# Patient Record
Sex: FEMALE | Race: WHITE | Employment: OTHER | URBAN - METROPOLITAN AREA
[De-identification: names, ages, dates, MRNs, and addresses within clinical notes are randomized per-mention and may not be internally consistent; named-entity substitution may affect disease eponyms.]

---

## 2021-11-24 DIAGNOSIS — M25.561 RIGHT KNEE PAIN, UNSPECIFIED CHRONICITY: Primary | ICD-10-CM

## 2021-11-30 ENCOUNTER — OFFICE VISIT (OUTPATIENT)
Dept: ORTHOPEDIC SURGERY | Age: 76
End: 2021-11-30
Payer: MEDICARE

## 2021-11-30 VITALS — TEMPERATURE: 98 F | WEIGHT: 160 LBS | HEIGHT: 65 IN | BODY MASS INDEX: 26.66 KG/M2

## 2021-11-30 DIAGNOSIS — M17.11 PRIMARY OSTEOARTHRITIS OF RIGHT KNEE: Primary | ICD-10-CM

## 2021-11-30 PROCEDURE — G8400 PT W/DXA NO RESULTS DOC: HCPCS | Performed by: ORTHOPAEDIC SURGERY

## 2021-11-30 PROCEDURE — G8417 CALC BMI ABV UP PARAM F/U: HCPCS | Performed by: ORTHOPAEDIC SURGERY

## 2021-11-30 PROCEDURE — 3017F COLORECTAL CA SCREEN DOC REV: CPT | Performed by: ORTHOPAEDIC SURGERY

## 2021-11-30 PROCEDURE — 4004F PT TOBACCO SCREEN RCVD TLK: CPT | Performed by: ORTHOPAEDIC SURGERY

## 2021-11-30 PROCEDURE — G8484 FLU IMMUNIZE NO ADMIN: HCPCS | Performed by: ORTHOPAEDIC SURGERY

## 2021-11-30 PROCEDURE — 1123F ACP DISCUSS/DSCN MKR DOCD: CPT | Performed by: ORTHOPAEDIC SURGERY

## 2021-11-30 PROCEDURE — 1090F PRES/ABSN URINE INCON ASSESS: CPT | Performed by: ORTHOPAEDIC SURGERY

## 2021-11-30 PROCEDURE — 99203 OFFICE O/P NEW LOW 30 MIN: CPT | Performed by: ORTHOPAEDIC SURGERY

## 2021-11-30 PROCEDURE — G8427 DOCREV CUR MEDS BY ELIG CLIN: HCPCS | Performed by: ORTHOPAEDIC SURGERY

## 2021-11-30 PROCEDURE — 4040F PNEUMOC VAC/ADMIN/RCVD: CPT | Performed by: ORTHOPAEDIC SURGERY

## 2021-11-30 RX ORDER — MIRTAZAPINE 15 MG/1
TABLET, FILM COATED ORAL
COMMUNITY
Start: 2021-10-18

## 2021-11-30 RX ORDER — VALSARTAN AND HYDROCHLOROTHIAZIDE 80; 12.5 MG/1; MG/1
TABLET, FILM COATED ORAL
COMMUNITY
Start: 2021-10-18

## 2021-11-30 RX ORDER — METFORMIN HYDROCHLORIDE 500 MG/1
TABLET, EXTENDED RELEASE ORAL
COMMUNITY
Start: 2021-10-18

## 2021-11-30 RX ORDER — ALPRAZOLAM 0.5 MG/1
TABLET ORAL
COMMUNITY
Start: 2021-09-22

## 2021-11-30 NOTE — PROGRESS NOTES
Chief Complaint   Patient presents with    Knee Pain     right knee pain started a while ago has been walking but seems to be getting better. But it locked and couldnt bend it and had swelling at the time she called an made an appt       Subjective:     Patient ID: Sabrina Benavides is a 77 y.o..  female    Knee Pain  Patient complains of right knee pain. This is evaluated as a personal injury. There was not a history of injury. The pain began several years ago. The pain is located medial, anterior. She describes  Her symptoms as aching. She has experienced popping, clicking, locking, and giving way in the affected knee. The patient has had pain with kneeling, squating, and climbing stairs. Symptoms improve with rest, heat, ice. The symptoms are worse with activity. The knee has given out or felt unstable. The patient cannot bend and straighten the knee fully. The patient is active in none. Treatment to date has been ice, heat, Tylenol, without significant relief. The patient is not working. The patients occupation is retired. Past Medical History:   Diagnosis Date    Anxiety     Diabetes ([facility] [address]     HTN (hypertension)      Past Surgical History:   Procedure Laterality Date    APPENDECTOMY      CHOLECYSTECTOMY      ELBOW SURGERY      HYSTERECTOMY      SHOULDER SURGERY         Current Outpatient Medications:     ALPRAZolam (XANAX) 0.5 MG tablet, take 1 tablet by mouth three times a day if needed, Disp: , Rfl:     metFORMIN (GLUCOPHAGE-XR) 500 MG extended release tablet, , Disp: , Rfl:     valsartan-hydroCHLOROthiazide (DIOVAN-HCT) 80-12.5 MG per tablet, , Disp: , Rfl:     mirtazapine (REMERON) 15 MG tablet, , Disp: , Rfl:   Allergies   Allergen Reactions    Sulfa Antibiotics Anaphylaxis     Swells     Tramadol Other (See Comments)     Makes her mind Crazy      Social History     Socioeconomic History    Marital status:       Spouse name: Not on file    Number of children: Not on file  Years of education: Not on file    Highest education level: Not on file   Occupational History    Not on file   Tobacco Use    Smoking status: Not on file    Smokeless tobacco: Not on file   Substance and Sexual Activity    Alcohol use: Not on file    Drug use: Not on file    Sexual activity: Not on file   Other Topics Concern    Not on file   Social History Narrative    Not on file     Social Determinants of Health     Financial Resource Strain:     Difficulty of Paying Living Expenses: Not on file   Food Insecurity:     Worried About Running Out of Food in the Last Year: Not on file    Mei of Food in the Last Year: Not on file   Transportation Needs:     Lack of Transportation (Medical): Not on file    Lack of Transportation (Non-Medical): Not on file   Physical Activity:     Days of Exercise per Week: Not on file    Minutes of Exercise per Session: Not on file   Stress:     Feeling of Stress : Not on file   Social Connections:     Frequency of Communication with Friends and Family: Not on file    Frequency of Social Gatherings with Friends and Family: Not on file    Attends Hindu Services: Not on file    Active Member of [address] [place] [address] or Organizations: Not on file    Attends Club or Organization Meetings: Not on file    Marital Status: Not on file   Intimate Partner Violence:     Fear of Current or Ex-Partner: Not on file    Emotionally Abused: Not on file    Physically Abused: Not on file    Sexually Abused: Not on file   Housing Stability:     Unable to Pay for Housing in the Last Year: Not on file    Number of [place] in the Last Year: Not on file    Unstable Housing in the Last Year: Not on file     History reviewed. No pertinent family history. REVIEW OF SYSTEMS:     General/Constitution:  (-)weight loss, (-)fever, (-)chills, (-)weakness. Skin: (-) rash,(-) psoriasis,(-) eczema, (-)skin cancer.    Musculoskeletal: (-) fractures,  (-) dislocations,(-) collagen vascular disease, (-) fibromyalgia, (-) multiple sclerosis, (-) muscular dystrophy, (-) RSD,(-) joint pain (-)swelling, (-) joint pain,swelling. Neurologic: (-) epilepsy, (-)seizures,(-) brain tumor,(-) TIA, (-)stroke, (-)headaches, (-)Parkinson disease,(-) memory loss, (-) LOC. Cardiovascular: (-) Chest pain, (-) swelling in legs/feet, (-) SOB, (-) cramping in legs/feet with walking. Respiratory: (-) SOB, (-) Coughing, (-) night sweats. GI: (-) nausea, (-) vomiting, (-) diarrhea, (-) blood in stool, (-) gastric ulcer. Psychiatric: (-) Depression, (-) Anxiety, (-) bipolar disease, (-) Alzheimer's Disease  Allergic/Immunologic: (-) allergies latex, (-) allergies metal, (-) skin sensitivity. Hematlogic: (-) anemia, (-) blood transfusion, (-) DVT/PE, (-) Clotting disorders    Subjective:    Constitution:  Temp 98 °F (36.7 °C)   Ht 5' 5\" (1.651 m)   Wt 160 lb (72.6 kg)   BMI 26.63 kg/m²     Psycihatric:  The patient is alert and oriented x 3, appears to be stated age and in no distress. Respiratory:  Respiratory effort is not labored. Patient is not gasping. Palpation of the chest reveals no tactile fremitus. Skin:  Upon inspection: the skin appears warm, dry and intact. There is  a previous scar over the affected area. There is any cellulitis, lymphedema or cutaneous lesions noted in the lower extremities. Upon palpation there is no induration noted. Neurologic:  Gait: abnormal;  Motor exam of the lower extremities show ; quadriceps, hamstrings, foot dorsi and plantar flexors intact R.  5/5 and L. 5/5. Deep tendon reflexes are 2/4 at the knees and 2/4 at the ankles with strong extensor hallicus longus motor strength bilaterally. Sensory to both feet is intact to all sensory roots. Cardiovascular: The vascular exam is normal and is well perfused to distal extremities. Distal pulses DP/PT: R. 2+; L. 2+. There is cap refill noted less than two seconds in all digits.  There is not edema of the bilateral lower extremities. There is not varicosities noted in the distal extremities. Lymph:  Upon palpation,  there is no lymphadenopathy noted in bilateral lower extremities. Musculoskeletal:  Gait: abnormal; examination of the nails and digits reveal no cyanosis or clubbing. Lumbar exam:  On visual inspection, there is not deformity of the spine. full range of motion, no tenderness, palpable spasm or pain on motion. Special tests: Straight Leg Raise negative, Landen test negative. Hip exam:   Upon inspection, there is not deformity noted. Upon palpation there is not tenderness. ROM: is  full and symmetrical.   Strength: Hip Flexors 5/5; Hip Abductors 5/5; Hip Adduction 5/5. Knee exam:  Right knee exam shows;  range of motion of R. Knee is 0 to 115, and L. Knee is 0 to 120. The patient does have  pain on motion, effusion is mild, there is not tenderness over the  medial, lateral, anterior region, there are not any masses, there is not ligamentous instability, there is not  deformity noted. Knee exam: right positive for moderate crepitations, some mild tenderness laxity is not noted with  stress. There is not a popliteal cyst.    R. Knee:  Lachman's negative, Anterior Drawer negative, Posterior Drawer negative  Micha's negative, Thallasy  negative,   PF grind test negative, Apprehension test negative, Patellar J sign  negative  L. Knee:  Lachman's negative, Anterior Drawer negative, Posterior Drawer negative  Micha's negative, Thallasy  negative,   PF grind test negative, Apprehension test negative,  Patellar J sign  negative    Xray Exam:  1. There is no fracture dislocation right knee   2. Mild degenerative changes of the right knee more prominent within the   medial compartment.           Radiographic findings reviewed with patient    Assessment:  Encounter Diagnoses   Name Primary?     Primary osteoarthritis of right knee Yes       Plan:  Natural history and expected course discussed. Questions answered. Educational materials distributed. Rest, ice, compression, and elevation (RICE) therapy. Reduction in offending activity. Patellar compression sleeve. I had a lengthy discussion with the patient regarding their diagnosis. I explained treatment options including surgical vs non surgical treatment. I reviewed in detail the risks and benefits and outlined the procedure in detail with expected outcomes and possible complications. I also discussed non surgical treatment such as injections (CSI and visco supplementation), physical therapy, topical creams and NSAID's. They have elected for conservative management at this time. She will call in the future if she is interested in knee injection.

## 2023-02-18 PROBLEM — R55 SYNCOPE AND COLLAPSE: Status: ACTIVE | Noted: 2023-02-18

## 2023-02-18 PROBLEM — K57.32 DIVERTICULITIS, COLON: Status: ACTIVE | Noted: 2023-02-18

## 2023-02-18 PROBLEM — U07.1 COVID-19 VIRUS INFECTION: Status: ACTIVE | Noted: 2023-02-18

## 2023-02-18 PROBLEM — R11.0 MODERATE NAUSEA: Status: ACTIVE | Noted: 2023-02-18

## 2023-02-18 PROBLEM — B37.31 VULVOVAGINITIS CANDIDA ALBICANS: Status: ACTIVE | Noted: 2023-02-18

## 2023-02-18 PROBLEM — M79.10 MYALGIA: Status: ACTIVE | Noted: 2023-02-18

## 2023-02-18 PROBLEM — J45.990 ASTHMA, EXERCISE INDUCED (HHS-HCC): Status: ACTIVE | Noted: 2023-02-18

## 2023-02-18 PROBLEM — E55.9 VITAMIN D DEFICIENCY: Status: ACTIVE | Noted: 2023-02-18

## 2023-02-18 PROBLEM — K43.2 HERNIA, INCISIONAL: Status: ACTIVE | Noted: 2023-02-18

## 2023-02-18 PROBLEM — E78.01 FAMILIAL HYPERCHOLESTEREMIA: Status: ACTIVE | Noted: 2023-02-18

## 2023-02-18 PROBLEM — N81.6 RECTOCELE: Status: ACTIVE | Noted: 2023-02-18

## 2023-02-18 PROBLEM — R91.8 PULMONARY NODULES: Status: ACTIVE | Noted: 2023-02-18

## 2023-02-18 PROBLEM — F41.0 PANIC DISORDER WITHOUT AGORAPHOBIA: Status: ACTIVE | Noted: 2023-02-18

## 2023-02-18 PROBLEM — R74.8 ELEVATED LIVER ENZYMES: Status: ACTIVE | Noted: 2023-02-18

## 2023-02-18 PROBLEM — R12 HEARTBURN: Status: ACTIVE | Noted: 2023-02-18

## 2023-02-18 PROBLEM — F43.21 GRIEF REACTION: Status: ACTIVE | Noted: 2023-02-18

## 2023-02-18 PROBLEM — B37.2 CANDIDAL INTERTRIGO: Status: ACTIVE | Noted: 2023-02-18

## 2023-02-18 PROBLEM — K59.09 CHRONIC CONSTIPATION: Status: ACTIVE | Noted: 2023-02-18

## 2023-02-18 PROBLEM — K76.0 FATTY LIVER DISEASE, NONALCOHOLIC: Status: ACTIVE | Noted: 2023-02-18

## 2023-02-18 PROBLEM — E79.0 HYPERURICEMIA: Status: ACTIVE | Noted: 2023-02-18

## 2023-02-18 PROBLEM — R06.00 DYSPNEA: Status: ACTIVE | Noted: 2023-02-18

## 2023-02-18 PROBLEM — R53.83 FATIGUE: Status: ACTIVE | Noted: 2023-02-18

## 2023-02-18 PROBLEM — L30.9 DERMATITIS, ECZEMATOID: Status: ACTIVE | Noted: 2023-02-18

## 2023-02-18 PROBLEM — J01.01 ACUTE RECURRENT MAXILLARY SINUSITIS: Status: ACTIVE | Noted: 2023-02-18

## 2023-02-18 PROBLEM — E83.52 HYPERCALCEMIA: Status: ACTIVE | Noted: 2023-02-18

## 2023-02-18 PROBLEM — R10.13 EPIGASTRIC PAIN: Status: ACTIVE | Noted: 2023-02-18

## 2023-02-18 PROBLEM — F43.20 GRIEF REACTION: Status: ACTIVE | Noted: 2023-02-18

## 2023-02-18 PROBLEM — G47.00 INSOMNIA: Status: ACTIVE | Noted: 2023-02-18

## 2023-02-18 PROBLEM — R10.11 ABDOMINAL PAIN, RUQ (RIGHT UPPER QUADRANT): Status: ACTIVE | Noted: 2023-02-18

## 2023-02-18 PROBLEM — N32.81 OVERACTIVE BLADDER: Status: ACTIVE | Noted: 2023-02-18

## 2023-02-18 PROBLEM — R51.9 HEADACHE: Status: ACTIVE | Noted: 2023-02-18

## 2023-02-18 PROBLEM — G62.9 PERIPHERAL NEUROPATHY: Status: ACTIVE | Noted: 2023-02-18

## 2023-02-18 PROBLEM — M47.812 CERVICAL SPONDYLOSIS: Status: ACTIVE | Noted: 2023-02-18

## 2023-02-18 PROBLEM — R39.15 URINARY URGENCY: Status: ACTIVE | Noted: 2023-02-18

## 2023-02-18 PROBLEM — I10 BENIGN ESSENTIAL HYPERTENSION: Status: ACTIVE | Noted: 2023-02-18

## 2023-02-18 PROBLEM — F41.1 GAD (GENERALIZED ANXIETY DISORDER): Status: ACTIVE | Noted: 2023-02-18

## 2023-02-18 PROBLEM — K57.32 SIGMOID DIVERTICULITIS: Status: ACTIVE | Noted: 2023-02-18

## 2023-02-18 PROBLEM — M10.9 GOUT INVOLVING TOE: Status: ACTIVE | Noted: 2023-02-18

## 2023-02-18 PROBLEM — N95.2 ATROPHIC VAGINITIS: Status: ACTIVE | Noted: 2023-02-18

## 2023-02-18 PROBLEM — E11.9 DIABETES MELLITUS, TYPE II (MULTI): Status: ACTIVE | Noted: 2023-02-18

## 2023-02-18 PROBLEM — K21.00 GASTRO-ESOPHAGEAL REFLUX DISEASE WITH ESOPHAGITIS: Status: ACTIVE | Noted: 2023-02-18

## 2023-02-18 RX ORDER — AMLODIPINE BESYLATE 5 MG/1
5 TABLET ORAL
COMMUNITY
Start: 2018-04-14 | End: 2023-03-08 | Stop reason: ALTCHOICE

## 2023-02-18 RX ORDER — ALPRAZOLAM 0.5 MG/1
0.5 TABLET ORAL 3 TIMES DAILY PRN
COMMUNITY
Start: 2012-07-28 | End: 2023-03-08 | Stop reason: SDUPTHER

## 2023-02-18 RX ORDER — BLOOD SUGAR DIAGNOSTIC
STRIP MISCELLANEOUS
COMMUNITY
Start: 2019-07-29 | End: 2023-03-08 | Stop reason: ALTCHOICE

## 2023-02-18 RX ORDER — OMEPRAZOLE 20 MG/1
20 CAPSULE, DELAYED RELEASE ORAL
COMMUNITY
Start: 2022-08-02 | End: 2023-03-08 | Stop reason: SDUPTHER

## 2023-02-18 RX ORDER — ROSUVASTATIN CALCIUM 20 MG/1
20 TABLET, COATED ORAL DAILY
COMMUNITY
Start: 2020-06-10 | End: 2023-03-08 | Stop reason: SDUPTHER

## 2023-02-18 RX ORDER — BETAMETHASONE DIPROPIONATE 0.5 MG/G
CREAM TOPICAL 2 TIMES DAILY
COMMUNITY
Start: 2013-10-22

## 2023-02-18 RX ORDER — FLUOXETINE 10 MG/1
10 CAPSULE ORAL DAILY
COMMUNITY
End: 2023-03-08 | Stop reason: SDUPTHER

## 2023-02-18 RX ORDER — MIRTAZAPINE 15 MG/1
7.5 TABLET, FILM COATED ORAL NIGHTLY
COMMUNITY
Start: 2018-03-28 | End: 2023-03-08 | Stop reason: SDUPTHER

## 2023-02-18 RX ORDER — VALSARTAN AND HYDROCHLOROTHIAZIDE 80; 12.5 MG/1; MG/1
1 TABLET, FILM COATED ORAL DAILY
COMMUNITY
Start: 2017-07-13 | End: 2023-03-08 | Stop reason: SDUPTHER

## 2023-02-18 RX ORDER — ISOPROPYL ALCOHOL 70 ML/100ML
1 SWAB TOPICAL 2 TIMES DAILY
COMMUNITY
End: 2023-03-08 | Stop reason: SDUPTHER

## 2023-02-18 RX ORDER — METFORMIN HYDROCHLORIDE 500 MG/1
1 TABLET ORAL
COMMUNITY
Start: 2022-03-30 | End: 2023-03-08 | Stop reason: SDUPTHER

## 2023-03-08 ENCOUNTER — OFFICE VISIT (OUTPATIENT)
Dept: PRIMARY CARE | Facility: CLINIC | Age: 78
End: 2023-03-08
Payer: MEDICARE

## 2023-03-08 VITALS
HEART RATE: 98 BPM | DIASTOLIC BLOOD PRESSURE: 82 MMHG | OXYGEN SATURATION: 99 % | SYSTOLIC BLOOD PRESSURE: 134 MMHG | WEIGHT: 165 LBS | BODY MASS INDEX: 27.46 KG/M2

## 2023-03-08 DIAGNOSIS — E11.9 TYPE 2 DIABETES MELLITUS WITHOUT COMPLICATION, WITHOUT LONG-TERM CURRENT USE OF INSULIN (MULTI): ICD-10-CM

## 2023-03-08 DIAGNOSIS — E78.01 FAMILIAL HYPERCHOLESTEREMIA: ICD-10-CM

## 2023-03-08 DIAGNOSIS — K76.0 FATTY LIVER DISEASE, NONALCOHOLIC: ICD-10-CM

## 2023-03-08 DIAGNOSIS — F41.1 GAD (GENERALIZED ANXIETY DISORDER): ICD-10-CM

## 2023-03-08 DIAGNOSIS — M79.10 MYALGIA: ICD-10-CM

## 2023-03-08 DIAGNOSIS — I10 BENIGN ESSENTIAL HYPERTENSION: Primary | ICD-10-CM

## 2023-03-08 DIAGNOSIS — K21.00 GASTROESOPHAGEAL REFLUX DISEASE WITH ESOPHAGITIS WITHOUT HEMORRHAGE: ICD-10-CM

## 2023-03-08 DIAGNOSIS — F51.01 PRIMARY INSOMNIA: ICD-10-CM

## 2023-03-08 LAB
ALBUMIN (G/DL) IN SER/PLAS: 5 G/DL (ref 3.4–5)
ALBUMIN (MG/L) IN URINE: 11.4 MG/L
ALBUMIN/CREATININE (UG/MG) IN URINE: 16.5 UG/MG CRT (ref 0–30)
ANION GAP IN SER/PLAS: 15 MMOL/L (ref 10–20)
CALCIUM (MG/DL) IN SER/PLAS: 10.2 MG/DL (ref 8.6–10.3)
CARBON DIOXIDE, TOTAL (MMOL/L) IN SER/PLAS: 30 MMOL/L (ref 21–32)
CHLORIDE (MMOL/L) IN SER/PLAS: 98 MMOL/L (ref 98–107)
CHOLESTEROL (MG/DL) IN SER/PLAS: 282 MG/DL (ref 0–199)
CHOLESTEROL IN HDL (MG/DL) IN SER/PLAS: 62.4 MG/DL
CHOLESTEROL/HDL RATIO: 4.5
CREATINE KINASE (U/L) IN SER/PLAS: 120 U/L (ref 0–215)
CREATININE (MG/DL) IN SER/PLAS: 0.79 MG/DL (ref 0.5–1.05)
CREATININE (MG/DL) IN URINE: 69.2 MG/DL (ref 20–320)
GFR FEMALE: 77 ML/MIN/1.73M2
GLUCOSE (MG/DL) IN SER/PLAS: 117 MG/DL (ref 74–99)
LDL: 171 MG/DL (ref 0–99)
NON HDL CHOLESTEROL: 220 MG/DL
PHOSPHATE (MG/DL) IN SER/PLAS: 4.1 MG/DL (ref 2.5–4.9)
POC HEMOGLOBIN A1C: 6.6 % (ref 4.2–6.5)
POTASSIUM (MMOL/L) IN SER/PLAS: 4.9 MMOL/L (ref 3.5–5.3)
SODIUM (MMOL/L) IN SER/PLAS: 138 MMOL/L (ref 136–145)
TRIGLYCERIDE (MG/DL) IN SER/PLAS: 241 MG/DL (ref 0–149)
UREA NITROGEN (MG/DL) IN SER/PLAS: 18 MG/DL (ref 6–23)
VLDL: 48 MG/DL (ref 0–40)

## 2023-03-08 PROCEDURE — 82550 ASSAY OF CK (CPK): CPT

## 2023-03-08 PROCEDURE — 80358 DRUG SCREENING METHADONE: CPT

## 2023-03-08 PROCEDURE — 80361 OPIATES 1 OR MORE: CPT

## 2023-03-08 PROCEDURE — 3075F SYST BP GE 130 - 139MM HG: CPT | Performed by: FAMILY MEDICINE

## 2023-03-08 PROCEDURE — 1159F MED LIST DOCD IN RCRD: CPT | Performed by: FAMILY MEDICINE

## 2023-03-08 PROCEDURE — 82570 ASSAY OF URINE CREATININE: CPT

## 2023-03-08 PROCEDURE — 80307 DRUG TEST PRSMV CHEM ANLYZR: CPT

## 2023-03-08 PROCEDURE — 80365 DRUG SCREENING OXYCODONE: CPT

## 2023-03-08 PROCEDURE — 80368 SEDATIVE HYPNOTICS: CPT

## 2023-03-08 PROCEDURE — 99214 OFFICE O/P EST MOD 30 MIN: CPT | Performed by: FAMILY MEDICINE

## 2023-03-08 PROCEDURE — 1160F RVW MEDS BY RX/DR IN RCRD: CPT | Performed by: FAMILY MEDICINE

## 2023-03-08 PROCEDURE — 80346 BENZODIAZEPINES1-12: CPT

## 2023-03-08 PROCEDURE — 80069 RENAL FUNCTION PANEL: CPT

## 2023-03-08 PROCEDURE — 83036 HEMOGLOBIN GLYCOSYLATED A1C: CPT | Performed by: FAMILY MEDICINE

## 2023-03-08 PROCEDURE — 3079F DIAST BP 80-89 MM HG: CPT | Performed by: FAMILY MEDICINE

## 2023-03-08 PROCEDURE — 82043 UR ALBUMIN QUANTITATIVE: CPT

## 2023-03-08 PROCEDURE — 80373 DRUG SCREENING TRAMADOL: CPT

## 2023-03-08 PROCEDURE — 36415 COLL VENOUS BLD VENIPUNCTURE: CPT | Performed by: FAMILY MEDICINE

## 2023-03-08 PROCEDURE — 80354 DRUG SCREENING FENTANYL: CPT

## 2023-03-08 PROCEDURE — 80061 LIPID PANEL: CPT

## 2023-03-08 RX ORDER — ALPRAZOLAM 0.5 MG/1
0.5 TABLET ORAL 3 TIMES DAILY PRN
Qty: 90 TABLET | Refills: 1 | Status: SHIPPED | OUTPATIENT
Start: 2023-03-08 | End: 2023-03-08 | Stop reason: SDUPTHER

## 2023-03-08 RX ORDER — METFORMIN HYDROCHLORIDE 500 MG/1
500 TABLET ORAL
Qty: 90 TABLET | Refills: 1 | Status: SHIPPED | OUTPATIENT
Start: 2023-03-08 | End: 2023-03-09 | Stop reason: SDUPTHER

## 2023-03-08 RX ORDER — ALPRAZOLAM 0.5 MG/1
0.5 TABLET ORAL 3 TIMES DAILY PRN
Qty: 90 TABLET | Refills: 1 | Status: SHIPPED | OUTPATIENT
Start: 2023-03-08 | End: 2023-06-22 | Stop reason: SDUPTHER

## 2023-03-08 RX ORDER — VALSARTAN AND HYDROCHLOROTHIAZIDE 80; 12.5 MG/1; MG/1
1 TABLET, FILM COATED ORAL DAILY
Qty: 90 TABLET | Refills: 1 | Status: SHIPPED | OUTPATIENT
Start: 2023-03-08 | End: 2023-03-09 | Stop reason: SDUPTHER

## 2023-03-08 RX ORDER — BLOOD SUGAR DIAGNOSTIC
1 STRIP MISCELLANEOUS 2 TIMES DAILY
Qty: 200 STRIP | Refills: 3 | Status: SHIPPED | OUTPATIENT
Start: 2023-03-08 | End: 2023-03-09 | Stop reason: SDUPTHER

## 2023-03-08 RX ORDER — MIRTAZAPINE 15 MG/1
7.5 TABLET, FILM COATED ORAL NIGHTLY
Qty: 90 TABLET | Refills: 3 | Status: SHIPPED | OUTPATIENT
Start: 2023-03-08 | End: 2023-03-09 | Stop reason: SDUPTHER

## 2023-03-08 RX ORDER — LANCETS
1 EACH MISCELLANEOUS 2 TIMES DAILY
Qty: 200 EACH | Refills: 3 | Status: SHIPPED | OUTPATIENT
Start: 2023-03-08 | End: 2023-03-09 | Stop reason: SDUPTHER

## 2023-03-08 RX ORDER — ROSUVASTATIN CALCIUM 20 MG/1
20 TABLET, COATED ORAL DAILY
Qty: 90 TABLET | Refills: 1 | Status: SHIPPED | OUTPATIENT
Start: 2023-03-08 | End: 2023-03-09 | Stop reason: SDUPTHER

## 2023-03-08 RX ORDER — OMEPRAZOLE 20 MG/1
20 CAPSULE, DELAYED RELEASE ORAL
Qty: 90 CAPSULE | Refills: 3 | Status: SHIPPED | OUTPATIENT
Start: 2023-03-08 | End: 2023-03-09 | Stop reason: SDUPTHER

## 2023-03-08 RX ORDER — FLUOXETINE 10 MG/1
10 CAPSULE ORAL DAILY
Qty: 90 CAPSULE | Refills: 1 | Status: SHIPPED | OUTPATIENT
Start: 2023-03-08 | End: 2023-03-09 | Stop reason: SDUPTHER

## 2023-03-08 RX ORDER — LANCETS
1 EACH MISCELLANEOUS 2 TIMES DAILY
COMMUNITY
End: 2023-03-08 | Stop reason: SDUPTHER

## 2023-03-08 RX ORDER — ISOPROPYL ALCOHOL 70 ML/100ML
1 SWAB TOPICAL 2 TIMES DAILY
Qty: 200 EACH | Refills: 3 | Status: SHIPPED | OUTPATIENT
Start: 2023-03-08 | End: 2023-03-09 | Stop reason: SDUPTHER

## 2023-03-08 RX ORDER — BLOOD SUGAR DIAGNOSTIC
1 STRIP MISCELLANEOUS 2 TIMES DAILY
COMMUNITY
End: 2023-03-08 | Stop reason: SDUPTHER

## 2023-03-08 ASSESSMENT — ANXIETY QUESTIONNAIRES
7. FEELING AFRAID AS IF SOMETHING AWFUL MIGHT HAPPEN: SEVERAL DAYS
2. NOT BEING ABLE TO STOP OR CONTROL WORRYING: NEARLY EVERY DAY
IF YOU CHECKED OFF ANY PROBLEMS ON THIS QUESTIONNAIRE, HOW DIFFICULT HAVE THESE PROBLEMS MADE IT FOR YOU TO DO YOUR WORK, TAKE CARE OF THINGS AT HOME, OR GET ALONG WITH OTHER PEOPLE: SOMEWHAT DIFFICULT
6. BECOMING EASILY ANNOYED OR IRRITABLE: MORE THAN HALF THE DAYS
4. TROUBLE RELAXING: MORE THAN HALF THE DAYS
1. FEELING NERVOUS, ANXIOUS, OR ON EDGE: NEARLY EVERY DAY
3. WORRYING TOO MUCH ABOUT DIFFERENT THINGS: NEARLY EVERY DAY
5. BEING SO RESTLESS THAT IT IS HARD TO SIT STILL: MORE THAN HALF THE DAYS
GAD7 TOTAL SCORE: 16

## 2023-03-08 ASSESSMENT — ENCOUNTER SYMPTOMS
BLURRED VISION: 1
VISUAL CHANGE: 0
POLYPHAGIA: 0
DIZZINESS: 0
POLYDIPSIA: 0
HYPERTENSION: 1

## 2023-03-08 NOTE — PROGRESS NOTES
Subjective   Patient ID: Aury Kimball is a 78 y.o. female who presents for Diabetes, Hypertension, and Panic Attack (Needs refills, checks sugars 2 times a day).    Check up multiple med issues anxiety on alprazolam   DM II HGA1C 6.6 today  Eye exam last week no DM changes   Feet numbness tingling   Checks glucose 2 x day   Glucose meter Accu-ckek fast click lancets accu-chek guide  Supplies from [employer]  She has type 2 diabetes mellitus. No MedicAlert identification noted. The initial diagnosis of diabetes was made 3 years ago. Her disease course has been stable. Pertinent negatives for hypoglycemia include no dizziness. Associated symptoms include blurred vision. Pertinent negatives for diabetes include no chest pain, no foot ulcerations, no polydipsia, no polyphagia and no visual change. Symptoms are stable. There are no diabetic complications. Risk factors for coronary artery disease include diabetes mellitus and dyslipidemia. She is compliant with treatment all of the time. Her weight is stable. She is following a diabetic diet. She participates in exercise intermittently. There is no change in her home blood glucose trend. Her breakfast blood glucose is taken between 7-8 am. Her breakfast blood glucose range is generally  mg/dl. Her dinner blood glucose range is generally 130-140 mg/dl. An ACE inhibitor/angiotensin II receptor blocker is being taken. Eye exam is current.   Hypertension  This is a chronic problem. The current episode started more than 1 year ago. The problem is unchanged. The problem is controlled. Associated symptoms include blurred vision. Pertinent negatives include no chest pain. Risk factors for coronary artery disease include diabetes mellitus and dyslipidemia.        Review of Systems   Eyes:  Positive for blurred vision.   Cardiovascular:  Negative for chest pain.   Endocrine: Negative for polydipsia and polyphagia.   Neurological:  Negative for dizziness.        Objective   /82   Pulse 98   Wt 74.8 kg (165 lb)   SpO2 99%   BMI 27.46 kg/m²     Physical Exam  Constitutional:       General: She is not in acute distress.     Appearance: Normal appearance. She is not ill-appearing.   HENT:      Head: Normocephalic.      Right Ear: Tympanic membrane, ear canal and external ear normal.      Left Ear: Tympanic membrane, ear canal and external ear normal.      Nose: Nose normal.      Mouth/Throat:      Mouth: Mucous membranes are moist.   Eyes:      Conjunctiva/sclera: Conjunctivae normal.   Neck:      Vascular: No carotid bruit.   Cardiovascular:      Rate and Rhythm: Normal rate and regular rhythm.      Pulses: Normal pulses.      Heart sounds: No murmur heard.     No gallop.   Pulmonary:      Effort: No respiratory distress.      Breath sounds: No wheezing, rhonchi or rales.   Abdominal:      General: Abdomen is flat. Bowel sounds are normal. There is no distension.      Palpations: Abdomen is soft. There is no mass.      Tenderness: There is no abdominal tenderness.   Musculoskeletal:         General: No swelling.      Cervical back: No tenderness.      Right lower leg: No edema.      Left lower leg: No edema.   Lymphadenopathy:      Cervical: No cervical adenopathy.   Skin:     Coloration: Skin is not jaundiced.      Findings: No bruising, lesion or rash.   Neurological:      Mental Status: She is alert and oriented to person, place, and time.   Psychiatric:         Mood and Affect: Mood normal.         Behavior: Behavior normal.       Assessment/Plan   Problem List Items Addressed This Visit          Nervous    Insomnia       Circulatory    Benign essential hypertension - Primary    Relevant Medications    valsartan-hydrochlorothiazide (Diovan-HCT) 80-12.5 mg tablet    Other Relevant Orders    Renal Function Panel       Digestive    Fatty liver disease, nonalcoholic    Relevant Orders    Lipid Panel    Gastro-esophageal reflux disease with esophagitis     Relevant Medications    omeprazole (PriLOSEC) 20 mg DR capsule       Endocrine/Metabolic    Diabetes mellitus, type II (CMS/HCC)    Relevant Medications    lancets (Accu-Chek Fastclix Lancet Drum) misc    blood sugar diagnostic (Accu-Chek Guide test strips) strip    metFORMIN (Glucophage) 500 mg tablet    alcohol swabs pads, medicated    Other Relevant Orders    Lipid Panel    Albumin, urine, random    Renal Function Panel    POCT glycosylated hemoglobin (Hb A1C) manually resulted (Completed)       Other    Familial hypercholesteremia    Relevant Medications    rosuvastatin (Crestor) 20 mg tablet    Other Relevant Orders    Lipid Panel    REDD (generalized anxiety disorder)    Relevant Medications    FLUoxetine (PROzac) 10 mg capsule    mirtazapine (Remeron) 15 mg tablet    ALPRAZolam (Xanax) 0.5 mg tablet    Other Relevant Orders    Opiate/Opioid/Benzo Extended Prescription Compliance    OOB Internal Tracking   DM well controlled continue same meds.  Discussed diet exercise does beter with warmer weather.  Fluoxetine has helped anxiety but still needs alprazolam   @ 2 1/2 yrs ago .  Very proud of son flying helicopter / became a  trying to get his own.  ,

## 2023-03-09 DIAGNOSIS — E11.9 TYPE 2 DIABETES MELLITUS WITHOUT COMPLICATION, WITHOUT LONG-TERM CURRENT USE OF INSULIN (MULTI): ICD-10-CM

## 2023-03-09 DIAGNOSIS — F41.1 GAD (GENERALIZED ANXIETY DISORDER): ICD-10-CM

## 2023-03-09 DIAGNOSIS — I10 BENIGN ESSENTIAL HYPERTENSION: ICD-10-CM

## 2023-03-09 DIAGNOSIS — E78.01 FAMILIAL HYPERCHOLESTEREMIA: ICD-10-CM

## 2023-03-09 DIAGNOSIS — K21.00 GASTROESOPHAGEAL REFLUX DISEASE WITH ESOPHAGITIS WITHOUT HEMORRHAGE: ICD-10-CM

## 2023-03-09 RX ORDER — VALSARTAN AND HYDROCHLOROTHIAZIDE 80; 12.5 MG/1; MG/1
1 TABLET, FILM COATED ORAL DAILY
Qty: 90 TABLET | Refills: 1 | Status: SHIPPED
Start: 2023-03-09 | End: 2023-03-09 | Stop reason: SDUPTHER

## 2023-03-09 RX ORDER — BLOOD SUGAR DIAGNOSTIC
1 STRIP MISCELLANEOUS 2 TIMES DAILY
Qty: 200 STRIP | Refills: 3 | Status: SHIPPED | OUTPATIENT
Start: 2023-03-09 | End: 2023-12-11

## 2023-03-09 RX ORDER — METFORMIN HYDROCHLORIDE 500 MG/1
500 TABLET ORAL
Qty: 90 TABLET | Refills: 1 | Status: SHIPPED | OUTPATIENT
Start: 2023-03-09 | End: 2023-06-22 | Stop reason: SDUPTHER

## 2023-03-09 RX ORDER — OMEPRAZOLE 20 MG/1
20 CAPSULE, DELAYED RELEASE ORAL
Qty: 90 CAPSULE | Refills: 3 | Status: SHIPPED | OUTPATIENT
Start: 2023-03-09 | End: 2023-08-29

## 2023-03-09 RX ORDER — OMEPRAZOLE 20 MG/1
20 CAPSULE, DELAYED RELEASE ORAL
Qty: 90 CAPSULE | Refills: 3 | Status: SHIPPED
Start: 2023-03-09 | End: 2023-03-09 | Stop reason: SDUPTHER

## 2023-03-09 RX ORDER — ROSUVASTATIN CALCIUM 20 MG/1
20 TABLET, COATED ORAL DAILY
Qty: 90 TABLET | Refills: 1 | Status: SHIPPED
Start: 2023-03-09 | End: 2023-03-09 | Stop reason: SDUPTHER

## 2023-03-09 RX ORDER — FLUOXETINE 10 MG/1
10 CAPSULE ORAL DAILY
Qty: 90 CAPSULE | Refills: 1 | Status: SHIPPED | OUTPATIENT
Start: 2023-03-09 | End: 2023-06-22 | Stop reason: SDUPTHER

## 2023-03-09 RX ORDER — LANCETS
1 EACH MISCELLANEOUS 2 TIMES DAILY
Qty: 200 EACH | Refills: 3 | Status: SHIPPED | OUTPATIENT
Start: 2023-03-09 | End: 2023-12-28 | Stop reason: SDUPTHER

## 2023-03-09 RX ORDER — ISOPROPYL ALCOHOL 70 ML/100ML
1 SWAB TOPICAL 2 TIMES DAILY
Qty: 200 EACH | Refills: 3 | Status: SHIPPED
Start: 2023-03-09 | End: 2023-03-09 | Stop reason: SDUPTHER

## 2023-03-09 RX ORDER — MIRTAZAPINE 15 MG/1
7.5 TABLET, FILM COATED ORAL NIGHTLY
Qty: 90 TABLET | Refills: 3 | Status: SHIPPED | OUTPATIENT
Start: 2023-03-09 | End: 2023-06-21

## 2023-03-09 RX ORDER — LANCETS
1 EACH MISCELLANEOUS 2 TIMES DAILY
Qty: 200 EACH | Refills: 3 | Status: SHIPPED
Start: 2023-03-09 | End: 2023-03-09 | Stop reason: SDUPTHER

## 2023-03-09 RX ORDER — MIRTAZAPINE 15 MG/1
7.5 TABLET, FILM COATED ORAL NIGHTLY
Qty: 90 TABLET | Refills: 3 | Status: SHIPPED
Start: 2023-03-09 | End: 2023-03-09 | Stop reason: SDUPTHER

## 2023-03-09 RX ORDER — ROSUVASTATIN CALCIUM 20 MG/1
20 TABLET, COATED ORAL DAILY
Qty: 90 TABLET | Refills: 1 | Status: SHIPPED | OUTPATIENT
Start: 2023-03-09 | End: 2023-06-12

## 2023-03-09 RX ORDER — VALSARTAN AND HYDROCHLOROTHIAZIDE 80; 12.5 MG/1; MG/1
1 TABLET, FILM COATED ORAL DAILY
Qty: 90 TABLET | Refills: 1 | Status: SHIPPED | OUTPATIENT
Start: 2023-03-09 | End: 2023-06-22 | Stop reason: SDUPTHER

## 2023-03-09 RX ORDER — ISOPROPYL ALCOHOL 70 ML/100ML
1 SWAB TOPICAL 2 TIMES DAILY
Qty: 200 EACH | Refills: 3 | Status: SHIPPED | OUTPATIENT
Start: 2023-03-09

## 2023-03-09 RX ORDER — BLOOD SUGAR DIAGNOSTIC
1 STRIP MISCELLANEOUS 2 TIMES DAILY
Qty: 200 STRIP | Refills: 3 | Status: SHIPPED
Start: 2023-03-09 | End: 2023-03-09 | Stop reason: SDUPTHER

## 2023-03-09 RX ORDER — FLUOXETINE 10 MG/1
10 CAPSULE ORAL DAILY
Qty: 90 CAPSULE | Refills: 1 | Status: SHIPPED
Start: 2023-03-09 | End: 2023-03-09 | Stop reason: SDUPTHER

## 2023-03-09 RX ORDER — METFORMIN HYDROCHLORIDE 500 MG/1
500 TABLET ORAL
Qty: 90 TABLET | Refills: 1 | Status: SHIPPED
Start: 2023-03-09 | End: 2023-03-09 | Stop reason: SDUPTHER

## 2023-03-15 LAB
6-ACETYLMORPHINE: <25 NG/ML
7-AMINOCLONAZEPAM: <25 NG/ML
ALPHA-HYDROXYALPRAZOLAM: 144 NG/ML
ALPHA-HYDROXYMIDAZOLAM: <25 NG/ML
ALPRAZOLAM: 112 NG/ML
AMPHETAMINE (PRESENCE) IN URINE BY SCREEN METHOD: ABNORMAL
BARBITURATES PRESENCE IN URINE BY SCREEN METHOD: ABNORMAL
CANNABINOIDS IN URINE BY SCREEN METHOD: ABNORMAL
CHLORDIAZEPOXIDE: <25 NG/ML
CLONAZEPAM: <25 NG/ML
COCAINE (PRESENCE) IN URINE BY SCREEN METHOD: ABNORMAL
CODEINE: <50 NG/ML
CREATINE, URINE FOR DRUG: 70.3 MG/DL
DIAZEPAM: <25 NG/ML
DRUG SCREEN COMMENT URINE: ABNORMAL
EDDP: <25 NG/ML
FENTANYL CONFIRMATION, URINE: <2.5 NG/ML
HYDROCODONE: <25 NG/ML
HYDROMORPHONE: <25 NG/ML
LORAZEPAM: <25 NG/ML
METHADONE CONFIRMATION,URINE: <25 NG/ML
MIDAZOLAM: <25 NG/ML
MORPHINE URINE: <50 NG/ML
NORDIAZEPAM: <25 NG/ML
NORFENTANYL: <2.5 NG/ML
NORHYDROCODONE: <25 NG/ML
NOROXYCODONE: <25 NG/ML
O-DESMETHYLTRAMADOL: <50 NG/ML
OXAZEPAM: <25 NG/ML
OXYCODONE: <25 NG/ML
OXYMORPHONE: <25 NG/ML
PHENCYCLIDINE (PRESENCE) IN URINE BY SCREEN METHOD: ABNORMAL
TEMAZEPAM: <25 NG/ML
TRAMADOL: <50 NG/ML
ZOLPIDEM METABOLITE (ZCA): <25 NG/ML
ZOLPIDEM: <25 NG/ML

## 2023-06-12 DIAGNOSIS — E11.9 TYPE 2 DIABETES MELLITUS WITHOUT COMPLICATION, WITHOUT LONG-TERM CURRENT USE OF INSULIN (MULTI): Primary | ICD-10-CM

## 2023-06-12 DIAGNOSIS — E78.01 FAMILIAL HYPERCHOLESTEREMIA: ICD-10-CM

## 2023-06-12 RX ORDER — DEXTROSE 4 G
1 TABLET,CHEWABLE ORAL 2 TIMES DAILY
Qty: 200 EACH | Refills: 3 | Status: SHIPPED | OUTPATIENT
Start: 2023-06-12 | End: 2023-10-31

## 2023-06-12 RX ORDER — ROSUVASTATIN CALCIUM 20 MG/1
TABLET, COATED ORAL
Qty: 90 TABLET | Refills: 1 | Status: SHIPPED | OUTPATIENT
Start: 2023-06-12 | End: 2023-06-22 | Stop reason: SDUPTHER

## 2023-06-20 DIAGNOSIS — F41.1 GAD (GENERALIZED ANXIETY DISORDER): ICD-10-CM

## 2023-06-21 DIAGNOSIS — F41.1 GAD (GENERALIZED ANXIETY DISORDER): ICD-10-CM

## 2023-06-21 RX ORDER — MIRTAZAPINE 15 MG/1
TABLET, FILM COATED ORAL
Qty: 45 TABLET | Refills: 0 | Status: SHIPPED | OUTPATIENT
Start: 2023-06-21 | End: 2023-06-22 | Stop reason: SDUPTHER

## 2023-06-22 ENCOUNTER — OFFICE VISIT (OUTPATIENT)
Dept: PRIMARY CARE | Facility: CLINIC | Age: 78
End: 2023-06-22
Payer: MEDICARE

## 2023-06-22 VITALS
BODY MASS INDEX: 26.99 KG/M2 | HEIGHT: 65 IN | WEIGHT: 162 LBS | DIASTOLIC BLOOD PRESSURE: 80 MMHG | HEART RATE: 77 BPM | OXYGEN SATURATION: 99 % | SYSTOLIC BLOOD PRESSURE: 136 MMHG

## 2023-06-22 DIAGNOSIS — E78.01 FAMILIAL HYPERCHOLESTEREMIA: ICD-10-CM

## 2023-06-22 DIAGNOSIS — F41.1 GAD (GENERALIZED ANXIETY DISORDER): ICD-10-CM

## 2023-06-22 DIAGNOSIS — M25.561 CHRONIC PAIN OF RIGHT KNEE: ICD-10-CM

## 2023-06-22 DIAGNOSIS — E11.9 TYPE 2 DIABETES MELLITUS WITHOUT COMPLICATION, WITHOUT LONG-TERM CURRENT USE OF INSULIN (MULTI): ICD-10-CM

## 2023-06-22 DIAGNOSIS — G89.29 CHRONIC PAIN OF RIGHT KNEE: ICD-10-CM

## 2023-06-22 DIAGNOSIS — I10 BENIGN ESSENTIAL HYPERTENSION: ICD-10-CM

## 2023-06-22 DIAGNOSIS — F33.9 DEPRESSION, RECURRENT (CMS-HCC): Primary | ICD-10-CM

## 2023-06-22 PROCEDURE — 1159F MED LIST DOCD IN RCRD: CPT | Performed by: FAMILY MEDICINE

## 2023-06-22 PROCEDURE — 1160F RVW MEDS BY RX/DR IN RCRD: CPT | Performed by: FAMILY MEDICINE

## 2023-06-22 PROCEDURE — 99213 OFFICE O/P EST LOW 20 MIN: CPT | Performed by: FAMILY MEDICINE

## 2023-06-22 PROCEDURE — G0439 PPPS, SUBSEQ VISIT: HCPCS | Performed by: FAMILY MEDICINE

## 2023-06-22 PROCEDURE — 3075F SYST BP GE 130 - 139MM HG: CPT | Performed by: FAMILY MEDICINE

## 2023-06-22 PROCEDURE — 1036F TOBACCO NON-USER: CPT | Performed by: FAMILY MEDICINE

## 2023-06-22 PROCEDURE — 3079F DIAST BP 80-89 MM HG: CPT | Performed by: FAMILY MEDICINE

## 2023-06-22 PROCEDURE — 1170F FXNL STATUS ASSESSED: CPT | Performed by: FAMILY MEDICINE

## 2023-06-22 RX ORDER — ROSUVASTATIN CALCIUM 20 MG/1
20 TABLET, COATED ORAL DAILY
Qty: 90 TABLET | Refills: 1 | Status: SHIPPED | OUTPATIENT
Start: 2023-06-22 | End: 2023-12-28 | Stop reason: SDUPTHER

## 2023-06-22 RX ORDER — VALSARTAN AND HYDROCHLOROTHIAZIDE 80; 12.5 MG/1; MG/1
1 TABLET, FILM COATED ORAL DAILY
Qty: 90 TABLET | Refills: 1 | Status: SHIPPED | OUTPATIENT
Start: 2023-06-22 | End: 2023-07-10

## 2023-06-22 RX ORDER — ROSUVASTATIN CALCIUM 20 MG/1
20 TABLET, COATED ORAL DAILY
Qty: 90 TABLET | Refills: 1 | Status: SHIPPED | OUTPATIENT
Start: 2023-06-22 | End: 2023-06-22 | Stop reason: SDUPTHER

## 2023-06-22 RX ORDER — MIRTAZAPINE 15 MG/1
7.5 TABLET, FILM COATED ORAL NIGHTLY
Qty: 45 TABLET | Refills: 1 | Status: SHIPPED | OUTPATIENT
Start: 2023-06-22 | End: 2023-12-11

## 2023-06-22 RX ORDER — DICLOFENAC SODIUM 10 MG/G
4 GEL TOPICAL 4 TIMES DAILY PRN
Qty: 20 G | Refills: 0 | COMMUNITY
Start: 2023-06-22 | End: 2023-11-21 | Stop reason: ALTCHOICE

## 2023-06-22 RX ORDER — FLUOXETINE 10 MG/1
10 CAPSULE ORAL DAILY
Qty: 90 CAPSULE | Refills: 1 | Status: SHIPPED | OUTPATIENT
Start: 2023-06-22 | End: 2023-11-21 | Stop reason: ALTCHOICE

## 2023-06-22 RX ORDER — METFORMIN HYDROCHLORIDE 500 MG/1
500 TABLET ORAL
Qty: 90 TABLET | Refills: 1 | Status: SHIPPED | OUTPATIENT
Start: 2023-06-22 | End: 2023-06-22 | Stop reason: SDUPTHER

## 2023-06-22 RX ORDER — METFORMIN HYDROCHLORIDE 500 MG/1
500 TABLET ORAL
Qty: 90 TABLET | Refills: 1 | Status: SHIPPED | OUTPATIENT
Start: 2023-06-22 | End: 2023-10-13

## 2023-06-22 RX ORDER — MIRTAZAPINE 15 MG/1
7.5 TABLET, FILM COATED ORAL NIGHTLY
Qty: 90 TABLET | Refills: 3 | OUTPATIENT
Start: 2023-06-22

## 2023-06-22 RX ORDER — MIRTAZAPINE 15 MG/1
7.5 TABLET, FILM COATED ORAL NIGHTLY
Qty: 45 TABLET | Refills: 1 | Status: SHIPPED | OUTPATIENT
Start: 2023-06-22 | End: 2023-06-22 | Stop reason: SDUPTHER

## 2023-06-22 RX ORDER — FLUOXETINE 10 MG/1
10 CAPSULE ORAL DAILY
Qty: 90 CAPSULE | Refills: 1 | Status: SHIPPED | OUTPATIENT
Start: 2023-06-22 | End: 2023-06-22 | Stop reason: SDUPTHER

## 2023-06-22 RX ORDER — VALSARTAN AND HYDROCHLOROTHIAZIDE 80; 12.5 MG/1; MG/1
1 TABLET, FILM COATED ORAL DAILY
Qty: 90 TABLET | Refills: 1 | Status: SHIPPED | OUTPATIENT
Start: 2023-06-22 | End: 2023-06-22 | Stop reason: SDUPTHER

## 2023-06-22 RX ORDER — ALPRAZOLAM 0.5 MG/1
0.5 TABLET ORAL 3 TIMES DAILY PRN
Qty: 90 TABLET | Refills: 1 | Status: SHIPPED | OUTPATIENT
Start: 2023-06-22 | End: 2023-09-18 | Stop reason: SDUPTHER

## 2023-06-22 ASSESSMENT — ANXIETY QUESTIONNAIRES
2. NOT BEING ABLE TO STOP OR CONTROL WORRYING: NEARLY EVERY DAY
3. WORRYING TOO MUCH ABOUT DIFFERENT THINGS: NEARLY EVERY DAY
1. FEELING NERVOUS, ANXIOUS, OR ON EDGE: NEARLY EVERY DAY
6. BECOMING EASILY ANNOYED OR IRRITABLE: NEARLY EVERY DAY
GAD7 TOTAL SCORE: 20
7. FEELING AFRAID AS IF SOMETHING AWFUL MIGHT HAPPEN: NEARLY EVERY DAY
4. TROUBLE RELAXING: MORE THAN HALF THE DAYS
5. BEING SO RESTLESS THAT IT IS HARD TO SIT STILL: NEARLY EVERY DAY
IF YOU CHECKED OFF ANY PROBLEMS ON THIS QUESTIONNAIRE, HOW DIFFICULT HAVE THESE PROBLEMS MADE IT FOR YOU TO DO YOUR WORK, TAKE CARE OF THINGS AT HOME, OR GET ALONG WITH OTHER PEOPLE: SOMEWHAT DIFFICULT

## 2023-06-22 ASSESSMENT — PATIENT HEALTH QUESTIONNAIRE - PHQ9
SUM OF ALL RESPONSES TO PHQ QUESTIONS 1-9: 10
4. FEELING TIRED OR HAVING LITTLE ENERGY: NEARLY EVERY DAY
5. POOR APPETITE OR OVEREATING: NOT AT ALL
6. FEELING BAD ABOUT YOURSELF - OR THAT YOU ARE A FAILURE OR HAVE LET YOURSELF OR YOUR FAMILY DOWN: NOT AT ALL
SUM OF ALL RESPONSES TO PHQ9 QUESTIONS 1 AND 2: 3
1. LITTLE INTEREST OR PLEASURE IN DOING THINGS: NOT AT ALL
9. THOUGHTS THAT YOU WOULD BE BETTER OFF DEAD, OR OF HURTING YOURSELF: NOT AT ALL
8. MOVING OR SPEAKING SO SLOWLY THAT OTHER PEOPLE COULD HAVE NOTICED. OR THE OPPOSITE, BEING SO FIGETY OR RESTLESS THAT YOU HAVE BEEN MOVING AROUND A LOT MORE THAN USUAL: NOT AT ALL
7. TROUBLE CONCENTRATING ON THINGS, SUCH AS READING THE NEWSPAPER OR WATCHING TELEVISION: SEVERAL DAYS
2. FEELING DOWN, DEPRESSED OR HOPELESS: NEARLY EVERY DAY
10. IF YOU CHECKED OFF ANY PROBLEMS, HOW DIFFICULT HAVE THESE PROBLEMS MADE IT FOR YOU TO DO YOUR WORK, TAKE CARE OF THINGS AT HOME, OR GET ALONG WITH OTHER PEOPLE: SOMEWHAT DIFFICULT
3. TROUBLE FALLING OR STAYING ASLEEP OR SLEEPING TOO MUCH: NEARLY EVERY DAY

## 2023-06-22 ASSESSMENT — ACTIVITIES OF DAILY LIVING (ADL)
TAKING_MEDICATION: INDEPENDENT
DOING_HOUSEWORK: INDEPENDENT
BATHING: INDEPENDENT
MANAGING_FINANCES: INDEPENDENT
GROCERY_SHOPPING: INDEPENDENT
DRESSING: INDEPENDENT
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
## 2023-07-10 DIAGNOSIS — I10 BENIGN ESSENTIAL HYPERTENSION: ICD-10-CM

## 2023-07-10 RX ORDER — VALSARTAN AND HYDROCHLOROTHIAZIDE 80; 12.5 MG/1; MG/1
TABLET, FILM COATED ORAL
Qty: 90 TABLET | Refills: 1 | Status: SHIPPED | OUTPATIENT
Start: 2023-07-10 | End: 2023-11-30

## 2023-08-29 DIAGNOSIS — K21.00 GASTROESOPHAGEAL REFLUX DISEASE WITH ESOPHAGITIS WITHOUT HEMORRHAGE: ICD-10-CM

## 2023-08-29 RX ORDER — OMEPRAZOLE 20 MG/1
20 CAPSULE, DELAYED RELEASE ORAL
Qty: 90 CAPSULE | Refills: 1 | Status: SHIPPED | OUTPATIENT
Start: 2023-08-29 | End: 2024-04-17

## 2023-09-18 ENCOUNTER — OFFICE VISIT (OUTPATIENT)
Dept: PRIMARY CARE | Facility: CLINIC | Age: 78
End: 2023-09-18
Payer: MEDICARE

## 2023-09-18 VITALS
SYSTOLIC BLOOD PRESSURE: 118 MMHG | HEIGHT: 65 IN | DIASTOLIC BLOOD PRESSURE: 64 MMHG | OXYGEN SATURATION: 98 % | BODY MASS INDEX: 26.49 KG/M2 | WEIGHT: 159 LBS | HEART RATE: 64 BPM

## 2023-09-18 DIAGNOSIS — Z23 FLU VACCINE NEED: ICD-10-CM

## 2023-09-18 DIAGNOSIS — K76.0 FATTY LIVER DISEASE, NONALCOHOLIC: ICD-10-CM

## 2023-09-18 DIAGNOSIS — E11.9 TYPE 2 DIABETES MELLITUS WITHOUT COMPLICATION, WITHOUT LONG-TERM CURRENT USE OF INSULIN (MULTI): Primary | ICD-10-CM

## 2023-09-18 DIAGNOSIS — E78.01 FAMILIAL HYPERCHOLESTEREMIA: ICD-10-CM

## 2023-09-18 DIAGNOSIS — F41.1 GAD (GENERALIZED ANXIETY DISORDER): ICD-10-CM

## 2023-09-18 LAB
ALANINE AMINOTRANSFERASE (SGPT) (U/L) IN SER/PLAS: 26 U/L (ref 7–45)
ALBUMIN (G/DL) IN SER/PLAS: 4.8 G/DL (ref 3.4–5)
ALKALINE PHOSPHATASE (U/L) IN SER/PLAS: 113 U/L (ref 33–136)
ASPARTATE AMINOTRANSFERASE (SGOT) (U/L) IN SER/PLAS: 26 U/L (ref 9–39)
BILIRUBIN DIRECT (MG/DL) IN SER/PLAS: 0.1 MG/DL (ref 0–0.3)
BILIRUBIN TOTAL (MG/DL) IN SER/PLAS: 0.6 MG/DL (ref 0–1.2)
CHOLESTEROL (MG/DL) IN SER/PLAS: 247 MG/DL (ref 0–199)
CHOLESTEROL IN HDL (MG/DL) IN SER/PLAS: 54.7 MG/DL
CHOLESTEROL/HDL RATIO: 4.5
LDL: 129 MG/DL (ref 0–99)
NON HDL CHOLESTEROL: 192 MG/DL
POC HEMOGLOBIN A1C: 6.3 % (ref 4.2–6.5)
PROTEIN TOTAL: 7.1 G/DL (ref 6.4–8.2)
TRIGLYCERIDE (MG/DL) IN SER/PLAS: 317 MG/DL (ref 0–149)
VLDL: 63 MG/DL (ref 0–40)

## 2023-09-18 PROCEDURE — 90662 IIV NO PRSV INCREASED AG IM: CPT | Performed by: FAMILY MEDICINE

## 2023-09-18 PROCEDURE — 3078F DIAST BP <80 MM HG: CPT | Performed by: FAMILY MEDICINE

## 2023-09-18 PROCEDURE — 80061 LIPID PANEL: CPT

## 2023-09-18 PROCEDURE — 1160F RVW MEDS BY RX/DR IN RCRD: CPT | Performed by: FAMILY MEDICINE

## 2023-09-18 PROCEDURE — 83036 HEMOGLOBIN GLYCOSYLATED A1C: CPT | Performed by: FAMILY MEDICINE

## 2023-09-18 PROCEDURE — 1036F TOBACCO NON-USER: CPT | Performed by: FAMILY MEDICINE

## 2023-09-18 PROCEDURE — 3074F SYST BP LT 130 MM HG: CPT | Performed by: FAMILY MEDICINE

## 2023-09-18 PROCEDURE — 80076 HEPATIC FUNCTION PANEL: CPT

## 2023-09-18 PROCEDURE — 90471 IMMUNIZATION ADMIN: CPT | Performed by: FAMILY MEDICINE

## 2023-09-18 PROCEDURE — 99214 OFFICE O/P EST MOD 30 MIN: CPT | Performed by: FAMILY MEDICINE

## 2023-09-18 PROCEDURE — 1159F MED LIST DOCD IN RCRD: CPT | Performed by: FAMILY MEDICINE

## 2023-09-18 RX ORDER — ALPRAZOLAM 0.5 MG/1
0.5 TABLET ORAL 3 TIMES DAILY PRN
Qty: 90 TABLET | Refills: 1 | Status: SHIPPED | OUTPATIENT
Start: 2023-09-18 | End: 2023-12-28 | Stop reason: SDUPTHER

## 2023-09-18 ASSESSMENT — ENCOUNTER SYMPTOMS
SHORTNESS OF BREATH: 0
HYPERTENSION: 1
APPETITE CHANGE: 0
ACTIVITY CHANGE: 0
WHEEZING: 0
PALPITATIONS: 0

## 2023-09-18 ASSESSMENT — ANXIETY QUESTIONNAIRES
3. WORRYING TOO MUCH ABOUT DIFFERENT THINGS: NEARLY EVERY DAY
GAD7 TOTAL SCORE: 19
1. FEELING NERVOUS, ANXIOUS, OR ON EDGE: SEVERAL DAYS
IF YOU CHECKED OFF ANY PROBLEMS ON THIS QUESTIONNAIRE, HOW DIFFICULT HAVE THESE PROBLEMS MADE IT FOR YOU TO DO YOUR WORK, TAKE CARE OF THINGS AT HOME, OR GET ALONG WITH OTHER PEOPLE: SOMEWHAT DIFFICULT
5. BEING SO RESTLESS THAT IT IS HARD TO SIT STILL: NEARLY EVERY DAY
6. BECOMING EASILY ANNOYED OR IRRITABLE: NEARLY EVERY DAY
7. FEELING AFRAID AS IF SOMETHING AWFUL MIGHT HAPPEN: NEARLY EVERY DAY
4. TROUBLE RELAXING: NEARLY EVERY DAY
2. NOT BEING ABLE TO STOP OR CONTROL WORRYING: NEARLY EVERY DAY

## 2023-09-18 ASSESSMENT — PATIENT HEALTH QUESTIONNAIRE - PHQ9
1. LITTLE INTEREST OR PLEASURE IN DOING THINGS: SEVERAL DAYS
10. IF YOU CHECKED OFF ANY PROBLEMS, HOW DIFFICULT HAVE THESE PROBLEMS MADE IT FOR YOU TO DO YOUR WORK, TAKE CARE OF THINGS AT HOME, OR GET ALONG WITH OTHER PEOPLE: SOMEWHAT DIFFICULT
2. FEELING DOWN, DEPRESSED OR HOPELESS: SEVERAL DAYS
SUM OF ALL RESPONSES TO PHQ9 QUESTIONS 1 AND 2: 2

## 2023-09-18 NOTE — PROGRESS NOTES
"Subjective   Patient ID: Aury Kimball is a 78 y.o. female who presents for Hypertension, Diabetes, and Anxiety.    Hypertension  Pertinent negatives include no chest pain, palpitations or shortness of breath.   Diabetes  Pertinent negatives for diabetes include no chest pain.      F/up  DM 2 and hypertension longstanding insomnia severe anxiety  And reason for visit renewal of alprazolam  Patient's   approximately 3 years ago  He now lives with her son in a garage that was converted to an apartment  She enjoys living there although is very little other outside social contact  Son flies a helicopter and goes all over the [place] is not home much  Takes both metformin in am  C/o vision disturbance and diarrhea from it also a HA (she however declines changing into a different medicine today as in the past)  BS have been good has good control of diabetes currently  Checks BS 2 x day  Has yrly eye exams last one less than 1 yr  Does have tingling in toes not foot     Review of Systems   Constitutional:  Negative for activity change and appetite change.   Eyes:  Positive for visual disturbance.   Respiratory:  Negative for shortness of breath and wheezing.    Cardiovascular:  Negative for chest pain, palpitations and leg swelling.       Objective   /64   Pulse 64   Ht 1.651 m (5' 5\")   Wt 72.1 kg (159 lb)   SpO2 98%   BMI 26.46 kg/m²     Physical Exam  Cardiovascular:      Pulses:           Dorsalis pedis pulses are 2+ on the right side and 2+ on the left side.        Posterior tibial pulses are 1+ on the right side and 1+ on the left side.   Musculoskeletal:      Right foot: Normal range of motion. No deformity.      Left foot: Normal range of motion. No deformity.   Feet:      Right foot:      Protective Sensation: 6 sites tested.  6 sites sensed.      Skin integrity: Skin integrity normal.      Toenail Condition: Right toenails are normal.      Left foot:      Protective Sensation: 6 " sites tested.  6 sites sensed.      Skin integrity: Skin integrity normal.      Toenail Condition: Left toenails are normal.       General: alert, no apparent distress, good hygiene   HEAD:  Normocephalic, atraumatic    EARS:  EAC patent, TMs normal,   EYES:  sclera white, OLMAN, conjunctiva noninjected  NOSE: Nasal passages patent   MOUTH: Pharynx clear, tongue uvula midline, grade 3 airway  Neck:  supple, no masses, thyroid non enlarged non nodular, no cervical adenopathy,  Lungs:  no wheezing, no rales , no rhonchi, normal respiratory pattern, breath sounds not diminished  Heart:  regular rate and  rhythm, no murmur, no ectopy, no S3 or S4, no carotid bruits  Abdomen:  soft NT,BS + ,  no organomegaly, no masses, no bruits  Extremities:  no edema, no cyanosis, no clubbing,  1+ posterior tibialis pulse    Psych:  speech fluent, normal affect, normal thought process  Skin:  no rashes, no concerning skin lesions, normal texture    Assessment/Plan   Problem List Items Addressed This Visit       Diabetes mellitus, type II (CMS/HCC) - Primary    Relevant Orders    POCT glycosylated hemoglobin (Hb A1C) manually resulted (Completed)    Familial hypercholesteremia    Relevant Orders    Lipid Panel    Fatty liver disease, nonalcoholic    Relevant Orders    Hepatic Function Panel    REDD (generalized anxiety disorder)    Relevant Medications    ALPRAZolam (Xanax) 0.5 mg tablet     Other Visit Diagnoses       Flu vaccine need        Relevant Orders    Flu vaccine, quadrivalent, high-dose, preservative free, age 65y+ (FLUZONE)        OARRS done patient primarily takes alprazolam at night 1-1/2 tablets.  Discussed and attempted to wean in the past, she is also taking mirtazapine.  Rarely takes it during the day.  Patient declines mammograms.  Despite the fact daughter had breast cancer and it does run in the family.

## 2023-10-13 DIAGNOSIS — E11.9 TYPE 2 DIABETES MELLITUS WITHOUT COMPLICATION, WITHOUT LONG-TERM CURRENT USE OF INSULIN (MULTI): ICD-10-CM

## 2023-10-13 RX ORDER — METFORMIN HYDROCHLORIDE 500 MG/1
500 TABLET ORAL
Qty: 180 TABLET | Refills: 1 | Status: SHIPPED | OUTPATIENT
Start: 2023-10-13 | End: 2023-12-28 | Stop reason: SDUPTHER

## 2023-10-31 DIAGNOSIS — E11.9 TYPE 2 DIABETES MELLITUS WITHOUT COMPLICATION, WITHOUT LONG-TERM CURRENT USE OF INSULIN (MULTI): ICD-10-CM

## 2023-10-31 RX ORDER — DEXTROSE 4 G
1 TABLET,CHEWABLE ORAL DAILY
Qty: 100 EACH | Refills: 3 | Status: SHIPPED | OUTPATIENT
Start: 2023-10-31 | End: 2023-12-11

## 2023-11-14 ENCOUNTER — TELEPHONE (OUTPATIENT)
Dept: PRIMARY CARE | Facility: CLINIC | Age: 78
End: 2023-11-14
Payer: MEDICARE

## 2023-11-14 DIAGNOSIS — H10.30 ACUTE BACTERIAL CONJUNCTIVITIS, UNSPECIFIED LATERALITY: Primary | ICD-10-CM

## 2023-11-14 RX ORDER — GENTAMICIN SULFATE 3 MG/ML
1-2 SOLUTION/ DROPS OPHTHALMIC EVERY 4 HOURS
Qty: 5 ML | Refills: 0 | Status: SHIPPED | OUTPATIENT
Start: 2023-11-14 | End: 2023-11-21 | Stop reason: ALTCHOICE

## 2023-11-21 ENCOUNTER — OFFICE VISIT (OUTPATIENT)
Dept: PRIMARY CARE | Facility: CLINIC | Age: 78
End: 2023-11-21
Payer: MEDICARE

## 2023-11-21 VITALS
HEIGHT: 65 IN | HEART RATE: 72 BPM | WEIGHT: 158 LBS | BODY MASS INDEX: 26.33 KG/M2 | DIASTOLIC BLOOD PRESSURE: 78 MMHG | OXYGEN SATURATION: 98 % | SYSTOLIC BLOOD PRESSURE: 132 MMHG

## 2023-11-21 DIAGNOSIS — J01.00 ACUTE NON-RECURRENT MAXILLARY SINUSITIS: Primary | ICD-10-CM

## 2023-11-21 DIAGNOSIS — H10.11 ALLERGIC CONJUNCTIVITIS OF RIGHT EYE: ICD-10-CM

## 2023-11-21 PROCEDURE — 1036F TOBACCO NON-USER: CPT

## 2023-11-21 PROCEDURE — 1159F MED LIST DOCD IN RCRD: CPT

## 2023-11-21 PROCEDURE — 3075F SYST BP GE 130 - 139MM HG: CPT

## 2023-11-21 PROCEDURE — 1160F RVW MEDS BY RX/DR IN RCRD: CPT

## 2023-11-21 PROCEDURE — 99213 OFFICE O/P EST LOW 20 MIN: CPT

## 2023-11-21 PROCEDURE — 3078F DIAST BP <80 MM HG: CPT

## 2023-11-21 RX ORDER — AZITHROMYCIN 250 MG/1
TABLET, FILM COATED ORAL
Qty: 6 TABLET | Refills: 0 | Status: SHIPPED | OUTPATIENT
Start: 2023-11-21 | End: 2023-11-26

## 2023-11-21 RX ORDER — KETOTIFEN FUMARATE 0.35 MG/ML
1 SOLUTION/ DROPS OPHTHALMIC 2 TIMES DAILY
Qty: 5 ML | Refills: 0 | Status: SHIPPED | OUTPATIENT
Start: 2023-11-21

## 2023-11-21 RX ORDER — LANSOPRAZOLE 30 MG/1
30 CAPSULE, DELAYED RELEASE ORAL
COMMUNITY
Start: 2007-05-18

## 2023-11-21 RX ORDER — FLUTICASONE PROPIONATE 50 MCG
1 SPRAY, SUSPENSION (ML) NASAL DAILY
Qty: 16 G | Refills: 11 | Status: SHIPPED | OUTPATIENT
Start: 2023-11-21 | End: 2024-11-20

## 2023-11-21 NOTE — PROGRESS NOTES
"Subjective   Patient ID: Aury Kimball is a 78 y.o. female who presents for Eye Drainage (RIGHT EYE PAIN, DRAINAGE RED ).  HPI  Aury presents for continued eye drainage and eye itching.  Dr. Rodriguez sent in gentamicin eye drops 11/14/23 - they helped but her R eye still itches and she says is red.   Sometimes she says it feels like there is \"gravel or sand in it.\"  Yellow/greenish drainage out of the eye.   Sometimes very watery.   When itchy it hurts but otherwise not hurting.   A little more blurriness due to the discharge from the eye.   Last eye doctor appointment was in the summer.   She does not wear contacts.   Son got a deer and thinks maybe she touched the deer and then touched her eye because this all started after that.    Has had a scratchy throat off and on.   Nose is also running. Sinus pressure in forehead.   No fever or chills.   Takes allergy medicine generic - did help with the itching.     Past Surgical History:   Procedure Laterality Date    APPENDECTOMY  07/05/2013    Appendectomy    CARDIAC CATHETERIZATION  06/07/2016    Cardiac Cath Procedure Summary    ELBOW SURGERY  07/05/2013    Elbow Surgery    GALLBLADDER SURGERY  07/05/2013    Gallbladder Surgery    NECK SURGERY  07/05/2013    Neck Surgery    OTHER SURGICAL HISTORY  07/05/2013    Hysterotomy (Obstetrical)    OTHER SURGICAL HISTORY  01/08/2019    Complete colonoscopy    SHOULDER SURGERY  07/05/2013    Shoulder Surgery Right      Past Medical History:   Diagnosis Date    Abdominal distension (gaseous) 10/02/2017    Bloating    Acute maxillary sinusitis, unspecified 12/21/2018    Acute non-recurrent maxillary sinusitis    Body mass index (BMI) 26.0-26.9, adult 01/29/2021    Body mass index (BMI) of 26.0 to 26.9 in adult    Body mass index (BMI) 28.0-28.9, adult 06/10/2020    Body mass index (BMI) of 28.0 to 28.9 in adult    Body mass index (BMI) 29.0-29.9, adult 03/09/2019    BMI 29.0-29.9,adult    Diverticulosis of intestine, part " unspecified, without perforation or abscess without bleeding 07/01/2019    Diverticulosis    Encounter for screening for diabetes mellitus 11/14/2016    Encounter for screening for diabetes mellitus    Fatty (change of) liver, not elsewhere classified 01/15/2019    Nonalcoholic fatty liver disease    Left lower quadrant pain 07/01/2019    Abdominal pain, LLQ (left lower quadrant)    Other gastritis without bleeding 05/08/2017    Bile reflux gastritis    Other intervertebral disc displacement, thoracic region 07/05/2013    Thoracic disc herniation    Pain in unspecified foot 03/07/2018    Pains, foot    Personal history of other (healed) physical injury and trauma 06/12/2017    History of traumatic injury of head    Personal history of other diseases of the respiratory system 10/10/2018    History of allergic rhinitis    Personal history of other diseases of the respiratory system 12/30/2020    History of bronchitis    Personal history of other diseases of the respiratory system 06/07/2016    History of laryngitis    Personal history of other endocrine, nutritional and metabolic disease 10/10/2018    History of hyperglycemia    Personal history of other endocrine, nutritional and metabolic disease 03/23/2018    History of hyperglycemia    Personal history of other infectious and parasitic diseases 03/26/2020    History of candidiasis of vagina    Personal history of other mental and behavioral disorders 03/08/2019    History of depression    Personal history of other mental and behavioral disorders 07/05/2013    History of anxiety disorder    Personal history of other specified conditions 12/19/2016    History of abdominal pain    Prediabetes 04/15/2018    Pre-diabetes    Spondylosis without myelopathy or radiculopathy, cervical region     Cervical spondylosis    Spondylosis without myelopathy or radiculopathy, lumbar region 07/05/2013    Lumbar spondylosis     Social History     Tobacco Use    Smoking status: Former      Types: Cigarettes    Smokeless tobacco: Former   Vaping Use    Vaping Use: Never used   Substance Use Topics    Alcohol use: Not Currently    Drug use: Never        Review of Systems  10 point review of systems performed and is negative except as noted in the HPI.      Current Outpatient Medications:     lansoprazole (Prevacid) 30 mg DR capsule, Take 1 capsule (30 mg) by mouth once daily in the morning. Take before meals., Disp: , Rfl:     alcohol swabs pads, medicated, Apply 1 Swab topically in the morning and 1 Swab before bedtime. Use to check blood glucose twice daily.., Disp: 200 each, Rfl: 3    ALPRAZolam (Xanax) 0.5 mg tablet, Take 1 tablet (0.5 mg) by mouth 3 times a day as needed for anxiety., Disp: 90 tablet, Rfl: 1    azithromycin (Zithromax) 250 mg tablet, Take 2 tablets (500 mg) by mouth once daily for 1 day, THEN 1 tablet (250 mg) once daily for 4 days. Take 2 tabs (500 mg) by mouth today, than 1 daily for 4 days.., Disp: 6 tablet, Rfl: 0    betamethasone dipropionate 0.05 % cream, Apply topically 2 times a day., Disp: , Rfl:     blood sugar diagnostic (Accu-Chek Guide test strips) strip, 1 strip  in the morning and 1 strip before bedtime. Use to check blood glucose twice daily.., Disp: 200 strip, Rfl: 3    blood-glucose meter (Accu-Chek Guide Me Glucose Mtr) misc, Inject 1 strip into the skin once daily. Use to test blood glucose levels, Disp: 100 each, Rfl: 3    fluticasone (Flonase) 50 mcg/actuation nasal spray, Administer 1 spray into each nostril once daily. Shake gently. Before first use, prime pump. After use, clean tip and replace cap., Disp: 16 g, Rfl: 11    ketotifen (Zaditor) 0.025 % (0.035 %) ophthalmic solution, Administer 1 drop into both eyes 2 times a day., Disp: 5 mL, Rfl: 0    lancets (Accu-Chek Fastclix Lancet Drum) misc, 1 Lancet  in the morning and 1 Lancet before bedtime. Use to check blood glucose twice daily.., Disp: 200 each, Rfl: 3    metFORMIN (Glucophage) 500 mg tablet,  "TAKE 1 TABLET TWICE DAILY WITH MEALS, Disp: 180 tablet, Rfl: 1    mirtazapine (Remeron) 15 mg tablet, Take 0.5 tablets (7.5 mg) by mouth once daily at bedtime., Disp: 45 tablet, Rfl: 1    omeprazole (PriLOSEC) 20 mg DR capsule, TAKE 1 CAPSULE EVERY DAY BEFORE A MEAL, Disp: 90 capsule, Rfl: 1    rosuvastatin (Crestor) 20 mg tablet, Take 1 tablet (20 mg) by mouth once daily., Disp: 90 tablet, Rfl: 1    valsartan-hydrochlorothiazide (Diovan-HCT) 80-12.5 mg tablet, TAKE 1 TABLET EVERY DAY (NEED MD APPOINTMENT FOR MORE REFILLS), Disp: 90 tablet, Rfl: 1    vit C-zinc citrate-elderberry 30-1.1-25 mg tablet,chewable, Chew 1 tablet once daily., Disp: , Rfl:      Objective   /78   Pulse 72   Ht 1.651 m (5' 5\")   Wt 71.7 kg (158 lb)   SpO2 98%   BMI 26.29 kg/m²     Physical Exam  Constitutional:       General: She is not in acute distress.     Appearance: Normal appearance. She is not ill-appearing.   HENT:      Head: Normocephalic and atraumatic.      Nose: Rhinorrhea present.      Right Sinus: Maxillary sinus tenderness present.      Left Sinus: Maxillary sinus tenderness present.      Mouth/Throat:      Mouth: Mucous membranes are moist.      Pharynx: Oropharynx is clear. No oropharyngeal exudate or posterior oropharyngeal erythema.   Eyes:      General:         Right eye: No discharge.         Left eye: No discharge.      Extraocular Movements: Extraocular movements intact.      Conjunctiva/sclera:      Right eye: Right conjunctiva is injected (minimal R inner corner of eye). No exudate or hemorrhage.     Left eye: Left conjunctiva is injected (minimal L inner corner of eye). No exudate or hemorrhage.  Cardiovascular:      Rate and Rhythm: Normal rate and regular rhythm.      Pulses: Normal pulses.      Heart sounds: Normal heart sounds. No murmur heard.  Pulmonary:      Effort: Pulmonary effort is normal.      Breath sounds: Normal breath sounds. No wheezing, rhonchi or rales.   Lymphadenopathy:      Cervical: " No cervical adenopathy.   Skin:     General: Skin is warm and dry.   Neurological:      Mental Status: She is alert and oriented to person, place, and time.         Assessment/Plan   Problem List Items Addressed This Visit    None  Visit Diagnoses       Acute non-recurrent maxillary sinusitis    -  Primary    Relevant Medications    azithromycin (Zithromax) 250 mg tablet    Allergic conjunctivitis of right eye        Relevant Medications    fluticasone (Flonase) 50 mcg/actuation nasal spray    ketotifen (Zaditor) 0.025 % (0.035 %) ophthalmic solution        Sinusitis - azithromycin, increase fluids, follow up if not improving     Allergic conjunctivitis - unlikely bacterial at this point due to using drops and presentation. Recommend flonase and ketotifen drops. If vision changes or symptoms do not improve please follow up.    Discussed at visit any disease processes that were of concern as well as the risks, benefits and instructions on any new medication provided. Patient (and/or caretaker of patient if present) stated all questions were answered, and they voiced understanding of instructions.

## 2023-11-21 NOTE — PATIENT INSTRUCTIONS
Azithromycin for the sinus infection   Ketotifen drops for the eye  Flonase (fluticasone) in the nose

## 2023-11-29 DIAGNOSIS — I10 BENIGN ESSENTIAL HYPERTENSION: ICD-10-CM

## 2023-11-30 RX ORDER — VALSARTAN AND HYDROCHLOROTHIAZIDE 80; 12.5 MG/1; MG/1
1 TABLET, FILM COATED ORAL DAILY
Qty: 90 TABLET | Refills: 1 | Status: SHIPPED | OUTPATIENT
Start: 2023-11-30 | End: 2023-12-28 | Stop reason: SDUPTHER

## 2023-12-11 DIAGNOSIS — E11.9 TYPE 2 DIABETES MELLITUS WITHOUT COMPLICATION, WITHOUT LONG-TERM CURRENT USE OF INSULIN (MULTI): ICD-10-CM

## 2023-12-11 RX ORDER — BLOOD SUGAR DIAGNOSTIC
STRIP MISCELLANEOUS
Qty: 200 STRIP | Refills: 3 | Status: SHIPPED | OUTPATIENT
Start: 2023-12-11 | End: 2023-12-28 | Stop reason: SDUPTHER

## 2023-12-11 RX ORDER — BLOOD-GLUCOSE METER
EACH MISCELLANEOUS
Qty: 1 EACH | Refills: 3 | Status: SHIPPED | OUTPATIENT
Start: 2023-12-11

## 2023-12-28 ENCOUNTER — OFFICE VISIT (OUTPATIENT)
Dept: PRIMARY CARE | Facility: CLINIC | Age: 78
End: 2023-12-28
Payer: MEDICARE

## 2023-12-28 VITALS
SYSTOLIC BLOOD PRESSURE: 124 MMHG | HEART RATE: 78 BPM | WEIGHT: 158 LBS | DIASTOLIC BLOOD PRESSURE: 74 MMHG | OXYGEN SATURATION: 98 % | BODY MASS INDEX: 26.33 KG/M2 | HEIGHT: 65 IN

## 2023-12-28 DIAGNOSIS — E78.01 FAMILIAL HYPERCHOLESTEREMIA: ICD-10-CM

## 2023-12-28 DIAGNOSIS — F33.9 DEPRESSION, RECURRENT (CMS-HCC): Primary | ICD-10-CM

## 2023-12-28 DIAGNOSIS — E11.9 TYPE 2 DIABETES MELLITUS WITHOUT COMPLICATION, WITHOUT LONG-TERM CURRENT USE OF INSULIN (MULTI): ICD-10-CM

## 2023-12-28 DIAGNOSIS — F41.1 GAD (GENERALIZED ANXIETY DISORDER): ICD-10-CM

## 2023-12-28 DIAGNOSIS — I10 BENIGN ESSENTIAL HYPERTENSION: ICD-10-CM

## 2023-12-28 PROCEDURE — 99214 OFFICE O/P EST MOD 30 MIN: CPT | Performed by: FAMILY MEDICINE

## 2023-12-28 PROCEDURE — 1160F RVW MEDS BY RX/DR IN RCRD: CPT | Performed by: FAMILY MEDICINE

## 2023-12-28 PROCEDURE — 1036F TOBACCO NON-USER: CPT | Performed by: FAMILY MEDICINE

## 2023-12-28 PROCEDURE — 3074F SYST BP LT 130 MM HG: CPT | Performed by: FAMILY MEDICINE

## 2023-12-28 PROCEDURE — 3078F DIAST BP <80 MM HG: CPT | Performed by: FAMILY MEDICINE

## 2023-12-28 PROCEDURE — 1159F MED LIST DOCD IN RCRD: CPT | Performed by: FAMILY MEDICINE

## 2023-12-28 RX ORDER — LANCETS
1 EACH MISCELLANEOUS 2 TIMES DAILY
Qty: 200 EACH | Refills: 3 | Status: SHIPPED | OUTPATIENT
Start: 2023-12-28 | End: 2024-03-06

## 2023-12-28 RX ORDER — MIRTAZAPINE 15 MG/1
TABLET, FILM COATED ORAL
Qty: 45 TABLET | Refills: 1 | Status: SHIPPED | OUTPATIENT
Start: 2023-12-28

## 2023-12-28 RX ORDER — VALSARTAN AND HYDROCHLOROTHIAZIDE 80; 12.5 MG/1; MG/1
1 TABLET, FILM COATED ORAL DAILY
Qty: 90 TABLET | Refills: 1 | Status: SHIPPED | OUTPATIENT
Start: 2023-12-28

## 2023-12-28 RX ORDER — ROSUVASTATIN CALCIUM 20 MG/1
20 TABLET, COATED ORAL DAILY
Qty: 90 TABLET | Refills: 1 | Status: SHIPPED | OUTPATIENT
Start: 2023-12-28 | End: 2024-02-03

## 2023-12-28 RX ORDER — METFORMIN HYDROCHLORIDE 500 MG/1
500 TABLET ORAL
Qty: 180 TABLET | Refills: 1 | Status: SHIPPED | OUTPATIENT
Start: 2023-12-28

## 2023-12-28 RX ORDER — BLOOD SUGAR DIAGNOSTIC
STRIP MISCELLANEOUS
Qty: 200 STRIP | Refills: 3 | Status: SHIPPED | OUTPATIENT
Start: 2023-12-28

## 2023-12-28 RX ORDER — FLUOXETINE 20 MG/1
20 TABLET ORAL DAILY
Qty: 90 TABLET | Refills: 3 | Status: SHIPPED | OUTPATIENT
Start: 2023-12-28 | End: 2023-12-29 | Stop reason: SDUPTHER

## 2023-12-28 RX ORDER — ALPRAZOLAM 0.5 MG/1
0.5 TABLET ORAL 3 TIMES DAILY PRN
Qty: 90 TABLET | Refills: 1 | Status: SHIPPED | OUTPATIENT
Start: 2023-12-28 | End: 2024-03-28

## 2023-12-28 ASSESSMENT — ANXIETY QUESTIONNAIRES
7. FEELING AFRAID AS IF SOMETHING AWFUL MIGHT HAPPEN: NEARLY EVERY DAY
6. BECOMING EASILY ANNOYED OR IRRITABLE: NEARLY EVERY DAY
GAD7 TOTAL SCORE: 21
3. WORRYING TOO MUCH ABOUT DIFFERENT THINGS: NEARLY EVERY DAY
5. BEING SO RESTLESS THAT IT IS HARD TO SIT STILL: NEARLY EVERY DAY
2. NOT BEING ABLE TO STOP OR CONTROL WORRYING: NEARLY EVERY DAY
4. TROUBLE RELAXING: NEARLY EVERY DAY
1. FEELING NERVOUS, ANXIOUS, OR ON EDGE: NEARLY EVERY DAY
IF YOU CHECKED OFF ANY PROBLEMS ON THIS QUESTIONNAIRE, HOW DIFFICULT HAVE THESE PROBLEMS MADE IT FOR YOU TO DO YOUR WORK, TAKE CARE OF THINGS AT HOME, OR GET ALONG WITH OTHER PEOPLE: EXTREMELY DIFFICULT

## 2023-12-28 ASSESSMENT — PATIENT HEALTH QUESTIONNAIRE - PHQ9
10. IF YOU CHECKED OFF ANY PROBLEMS, HOW DIFFICULT HAVE THESE PROBLEMS MADE IT FOR YOU TO DO YOUR WORK, TAKE CARE OF THINGS AT HOME, OR GET ALONG WITH OTHER PEOPLE: VERY DIFFICULT
1. LITTLE INTEREST OR PLEASURE IN DOING THINGS: NOT AT ALL
SUM OF ALL RESPONSES TO PHQ9 QUESTIONS 1 AND 2: 2
2. FEELING DOWN, DEPRESSED OR HOPELESS: SEVERAL DAYS
2. FEELING DOWN, DEPRESSED OR HOPELESS: NOT AT ALL
1. LITTLE INTEREST OR PLEASURE IN DOING THINGS: SEVERAL DAYS
SUM OF ALL RESPONSES TO PHQ9 QUESTIONS 1 AND 2: 0

## 2023-12-28 NOTE — PROGRESS NOTES
"Subjective   Patient ID: Aury Kimball is a 78 y.o. female who presents for Hypertension, Diabetes, and Anxiety.    HPI   For 6 months check up HGA1C  6.3  Patient feels blood sugars are doing well  Taking metformin no GI side effects    Getting depressed again restarted med   Patient was placed on medicine shortly after her    She took it a short time was doing better so stopped it  Restarted it  Started during the holidays  Patient lives in apartment built over son's garage  He is out of town frequently he is a   for an [employer]  He is doing very well advancing as a  she is very proud of him    Review of Systems    Objective   /74   Pulse 78   Ht 1.651 m (5' 5\")   Wt 71.7 kg (158 lb)   SpO2 98%   BMI 26.29 kg/m²     Physical Exam  General: alert, no apparent distress, good hygiene   HEAD:  Normocephalic, atraumatic    EARS:  EAC patent, TMs normal,   EYES:  sclera white, OLMAN, conjunctiva noninjected  NOSE: Nasal passages patent   MOUTH: Pharynx clear, tongue uvula midline, grade 3 airway  Neck:  supple, no masses, thyroid non enlarged non nodular, no cervical adenopathy,  Lungs:  no wheezing, no rales , no rhonchi, normal respiratory pattern, breath sounds not diminished  Heart:  regular rate and  rhythm, no murmur, no ectopy, no S3 or S4, no carotid bruits  Abdomen:  soft NT,BS + ,  no organomegaly, no masses, no bruits  Extremities:  no edema, no cyanosis, no clubbing,  2+ posterior tibialis pulse    Psych:  speech fluent, normal affect, normal thought process  Skin:  no rashes, no concerning skin lesions, normal texture    Assessment/Plan   Problem List Items Addressed This Visit             ICD-10-CM    Benign essential hypertension I10    Relevant Medications    valsartan-hydrochlorothiazide (Diovan-HCT) 80-12.5 mg tablet    Diabetes mellitus, type II (CMS/HCC) E11.9    Relevant Medications    metFORMIN (Glucophage) 500 mg tablet    " blood sugar diagnostic (Accu-Chek Guide test strips) strip    Familial hypercholesteremia E78.01    Relevant Medications    rosuvastatin (Crestor) 20 mg tablet    REDD (generalized anxiety disorder) F41.1    Relevant Medications    mirtazapine (Remeron) 15 mg tablet    ALPRAZolam (Xanax) 0.5 mg tablet   Restarted fluoxetine so prescription sent patient would prefer tablet over capsule.  Diabetes under adequate control.  She declined screening tests given no significant breast cancer and family

## 2023-12-29 DIAGNOSIS — F33.9 DEPRESSION, RECURRENT (CMS-HCC): ICD-10-CM

## 2023-12-29 DIAGNOSIS — F41.1 GAD (GENERALIZED ANXIETY DISORDER): ICD-10-CM

## 2023-12-29 RX ORDER — FLUOXETINE 20 MG/1
20 TABLET ORAL DAILY
Qty: 90 TABLET | Refills: 3 | Status: SHIPPED | OUTPATIENT
Start: 2023-12-29 | End: 2024-12-28

## 2024-02-03 DIAGNOSIS — E78.01 FAMILIAL HYPERCHOLESTEREMIA: ICD-10-CM

## 2024-02-03 RX ORDER — ROSUVASTATIN CALCIUM 20 MG/1
20 TABLET, COATED ORAL DAILY
Qty: 90 TABLET | Refills: 3 | Status: SHIPPED | OUTPATIENT
Start: 2024-02-03

## 2024-02-21 ENCOUNTER — OFFICE VISIT (OUTPATIENT)
Dept: PRIMARY CARE | Facility: CLINIC | Age: 79
End: 2024-02-21
Payer: MEDICARE

## 2024-02-21 VITALS
BODY MASS INDEX: 26.16 KG/M2 | WEIGHT: 157 LBS | HEIGHT: 65 IN | DIASTOLIC BLOOD PRESSURE: 72 MMHG | HEART RATE: 86 BPM | SYSTOLIC BLOOD PRESSURE: 130 MMHG | OXYGEN SATURATION: 98 %

## 2024-02-21 DIAGNOSIS — K21.00 GASTROESOPHAGEAL REFLUX DISEASE WITH ESOPHAGITIS WITHOUT HEMORRHAGE: ICD-10-CM

## 2024-02-21 DIAGNOSIS — F33.9 DEPRESSION, RECURRENT (CMS-HCC): ICD-10-CM

## 2024-02-21 DIAGNOSIS — Z71.89 CARDIAC RISK COUNSELING: ICD-10-CM

## 2024-02-21 DIAGNOSIS — E55.9 VITAMIN D DEFICIENCY: ICD-10-CM

## 2024-02-21 DIAGNOSIS — E11.9 TYPE 2 DIABETES MELLITUS WITHOUT COMPLICATION, WITHOUT LONG-TERM CURRENT USE OF INSULIN (MULTI): ICD-10-CM

## 2024-02-21 DIAGNOSIS — Z13.89 ENCOUNTER FOR SCREENING FOR OTHER DISORDER: ICD-10-CM

## 2024-02-21 DIAGNOSIS — Z00.00 ROUTINE GENERAL MEDICAL EXAMINATION AT HEALTH CARE FACILITY: Primary | ICD-10-CM

## 2024-02-21 DIAGNOSIS — Z79.899 BENZODIAZEPINE CONTRACT EXISTS: ICD-10-CM

## 2024-02-21 DIAGNOSIS — E78.01 FAMILIAL HYPERCHOLESTEREMIA: ICD-10-CM

## 2024-02-21 DIAGNOSIS — I10 BENIGN ESSENTIAL HYPERTENSION: ICD-10-CM

## 2024-02-21 DIAGNOSIS — F41.1 GAD (GENERALIZED ANXIETY DISORDER): ICD-10-CM

## 2024-02-21 PROBLEM — U07.1 COVID-19 VIRUS INFECTION: Status: RESOLVED | Noted: 2023-02-18 | Resolved: 2024-02-21

## 2024-02-21 LAB
25(OH)D3 SERPL-MCNC: 14 NG/ML (ref 30–100)
ALBUMIN SERPL BCP-MCNC: 4.8 G/DL (ref 3.4–5)
ALP SERPL-CCNC: 119 U/L (ref 33–136)
ALT SERPL W P-5'-P-CCNC: 23 U/L (ref 7–45)
AMPHETAMINES UR QL SCN: ABNORMAL
ANION GAP SERPL CALC-SCNC: 14 MMOL/L (ref 10–20)
AST SERPL W P-5'-P-CCNC: 27 U/L (ref 9–39)
BARBITURATES UR QL SCN: ABNORMAL
BENZODIAZ UR QL SCN: ABNORMAL
BILIRUB SERPL-MCNC: 0.6 MG/DL (ref 0–1.2)
BUN SERPL-MCNC: 13 MG/DL (ref 6–23)
BZE UR QL SCN: ABNORMAL
CALCIUM SERPL-MCNC: 10.3 MG/DL (ref 8.6–10.3)
CANNABINOIDS UR QL SCN: ABNORMAL
CHLORIDE SERPL-SCNC: 100 MMOL/L (ref 98–107)
CHOLEST SERPL-MCNC: 268 MG/DL (ref 0–199)
CHOLESTEROL/HDL RATIO: 4
CO2 SERPL-SCNC: 30 MMOL/L (ref 21–32)
CREAT SERPL-MCNC: 0.79 MG/DL (ref 0.5–1.05)
CREAT UR-MCNC: 88.8 MG/DL (ref 20–320)
EGFRCR SERPLBLD CKD-EPI 2021: 77 ML/MIN/1.73M*2
EST. AVERAGE GLUCOSE BLD GHB EST-MCNC: 120 MG/DL
FENTANYL+NORFENTANYL UR QL SCN: ABNORMAL
GLUCOSE SERPL-MCNC: 97 MG/DL (ref 74–99)
HBA1C MFR BLD: 5.8 %
HDLC SERPL-MCNC: 67 MG/DL
LDLC SERPL CALC-MCNC: 162 MG/DL
NON HDL CHOLESTEROL: 201 MG/DL (ref 0–149)
OPIATES UR QL SCN: ABNORMAL
OXYCODONE+OXYMORPHONE UR QL SCN: ABNORMAL
PCP UR QL SCN: ABNORMAL
POC APPEARANCE, URINE: CLEAR
POC BILIRUBIN, URINE: NEGATIVE
POC BLOOD, URINE: ABNORMAL
POC COLOR, URINE: YELLOW
POC GLUCOSE, URINE: NEGATIVE MG/DL
POC KETONES, URINE: NEGATIVE MG/DL
POC LEUKOCYTES, URINE: ABNORMAL
POC NITRITE,URINE: NEGATIVE
POC PH, URINE: 5 PH
POC PROTEIN, URINE: NEGATIVE MG/DL
POC SPECIFIC GRAVITY, URINE: 1.02
POC UROBILINOGEN, URINE: 0.2 EU/DL
POTASSIUM SERPL-SCNC: 4.8 MMOL/L (ref 3.5–5.3)
PROT SERPL-MCNC: 7.7 G/DL (ref 6.4–8.2)
PROT UR-ACNC: 11 MG/DL (ref 5–24)
PROT/CREAT UR: 0.12 MG/MG CREAT (ref 0–0.17)
SODIUM SERPL-SCNC: 139 MMOL/L (ref 136–145)
TRIGL SERPL-MCNC: 194 MG/DL (ref 0–149)
TSH SERPL-ACNC: 1.65 MIU/L (ref 0.44–3.98)
VLDL: 39 MG/DL (ref 0–40)

## 2024-02-21 PROCEDURE — 99214 OFFICE O/P EST MOD 30 MIN: CPT | Performed by: FAMILY MEDICINE

## 2024-02-21 PROCEDURE — 1158F ADVNC CARE PLAN TLK DOCD: CPT | Performed by: FAMILY MEDICINE

## 2024-02-21 PROCEDURE — 80053 COMPREHEN METABOLIC PANEL: CPT

## 2024-02-21 PROCEDURE — 36415 COLL VENOUS BLD VENIPUNCTURE: CPT

## 2024-02-21 PROCEDURE — 1123F ACP DISCUSS/DSCN MKR DOCD: CPT | Performed by: FAMILY MEDICINE

## 2024-02-21 PROCEDURE — 80061 LIPID PANEL: CPT

## 2024-02-21 PROCEDURE — 80346 BENZODIAZEPINES1-12: CPT

## 2024-02-21 PROCEDURE — 81003 URINALYSIS AUTO W/O SCOPE: CPT | Performed by: FAMILY MEDICINE

## 2024-02-21 PROCEDURE — 1160F RVW MEDS BY RX/DR IN RCRD: CPT | Performed by: FAMILY MEDICINE

## 2024-02-21 PROCEDURE — 80307 DRUG TEST PRSMV CHEM ANLYZR: CPT

## 2024-02-21 PROCEDURE — 1036F TOBACCO NON-USER: CPT | Performed by: FAMILY MEDICINE

## 2024-02-21 PROCEDURE — 84443 ASSAY THYROID STIM HORMONE: CPT

## 2024-02-21 PROCEDURE — G0439 PPPS, SUBSEQ VISIT: HCPCS | Performed by: FAMILY MEDICINE

## 2024-02-21 PROCEDURE — 99397 PER PM REEVAL EST PAT 65+ YR: CPT | Performed by: FAMILY MEDICINE

## 2024-02-21 PROCEDURE — 3075F SYST BP GE 130 - 139MM HG: CPT | Performed by: FAMILY MEDICINE

## 2024-02-21 PROCEDURE — 82570 ASSAY OF URINE CREATININE: CPT

## 2024-02-21 PROCEDURE — G0442 ANNUAL ALCOHOL SCREEN 15 MIN: HCPCS | Performed by: FAMILY MEDICINE

## 2024-02-21 PROCEDURE — 83036 HEMOGLOBIN GLYCOSYLATED A1C: CPT

## 2024-02-21 PROCEDURE — 1159F MED LIST DOCD IN RCRD: CPT | Performed by: FAMILY MEDICINE

## 2024-02-21 PROCEDURE — G0446 INTENS BEHAVE THER CARDIO DX: HCPCS | Performed by: FAMILY MEDICINE

## 2024-02-21 PROCEDURE — 1170F FXNL STATUS ASSESSED: CPT | Performed by: FAMILY MEDICINE

## 2024-02-21 PROCEDURE — 84156 ASSAY OF PROTEIN URINE: CPT

## 2024-02-21 PROCEDURE — 3078F DIAST BP <80 MM HG: CPT | Performed by: FAMILY MEDICINE

## 2024-02-21 PROCEDURE — 82306 VITAMIN D 25 HYDROXY: CPT

## 2024-02-21 RX ORDER — COLCHICINE 0.6 MG/1
0.6 TABLET ORAL
COMMUNITY
Start: 2018-03-23

## 2024-02-21 RX ORDER — ALLOPURINOL 100 MG/1
100 TABLET ORAL
COMMUNITY
Start: 2018-03-23

## 2024-02-21 ASSESSMENT — PATIENT HEALTH QUESTIONNAIRE - PHQ9
1. LITTLE INTEREST OR PLEASURE IN DOING THINGS: NOT AT ALL
SUM OF ALL RESPONSES TO PHQ9 QUESTIONS 1 AND 2: 0
2. FEELING DOWN, DEPRESSED OR HOPELESS: NOT AT ALL

## 2024-02-21 ASSESSMENT — ANXIETY QUESTIONNAIRES
6. BECOMING EASILY ANNOYED OR IRRITABLE: NOT AT ALL
7. FEELING AFRAID AS IF SOMETHING AWFUL MIGHT HAPPEN: NOT AT ALL
IF YOU CHECKED OFF ANY PROBLEMS ON THIS QUESTIONNAIRE, HOW DIFFICULT HAVE THESE PROBLEMS MADE IT FOR YOU TO DO YOUR WORK, TAKE CARE OF THINGS AT HOME, OR GET ALONG WITH OTHER PEOPLE: NOT DIFFICULT AT ALL
5. BEING SO RESTLESS THAT IT IS HARD TO SIT STILL: NOT AT ALL
2. NOT BEING ABLE TO STOP OR CONTROL WORRYING: SEVERAL DAYS
1. FEELING NERVOUS, ANXIOUS, OR ON EDGE: SEVERAL DAYS
4. TROUBLE RELAXING: NOT AT ALL
3. WORRYING TOO MUCH ABOUT DIFFERENT THINGS: NOT AT ALL
GAD7 TOTAL SCORE: 2

## 2024-02-21 ASSESSMENT — ACTIVITIES OF DAILY LIVING (ADL)
GROCERY_SHOPPING: INDEPENDENT
BATHING: INDEPENDENT
DOING_HOUSEWORK: INDEPENDENT
MANAGING_FINANCES: INDEPENDENT
DRESSING: INDEPENDENT
TAKING_MEDICATION: INDEPENDENT

## 2024-02-21 ASSESSMENT — ENCOUNTER SYMPTOMS
LOSS OF SENSATION IN FEET: 0
OCCASIONAL FEELINGS OF UNSTEADINESS: 0
DEPRESSION: 0

## 2024-02-21 NOTE — PROGRESS NOTES
Subjective   Reason for Visit: Aury Kimball is an 78 y.o. female here for a Medicare Wellness visit.     Past Medical, Surgical, and Family History reviewed and updated in chart.    Reviewed all medications by prescribing practitioner or clinical pharmacist (such as prescriptions, OTCs, herbal therapies and supplements) and documented in the medical record.    HPI  No SE meds  No CP, SOB, palpitations, weakness, dizziness, HA, vision changes  Some tingling in feet at night    Mood is up and down  No hopeless, worthless    Ophtho- about 1 year  Dentist- 2022  [facility]- 2/24  Colonoscopy- 12/15- will consider  PARTH-  FOBT-  UA/Micro- 3/23  Mammo- refused  DEXA- refused  PAP- N/A  Lung CT-  Coronary Calcium CT Score-  AAA-  EKG-  RSV-  Prevnar-  Flu- 9/23  Shingrix- 2/1/24  Td-  Hep C-  Advance Directives- has them  ETOH screen- 2/21/24  CV risk- 2/21/24    OARRS:  Pavel Jaramillo, DO on 2/21/2024  9:00 AM  I have personally reviewed the OARRS report for Aury Kimball. I have considered the risks of abuse, dependence, addiction and diversion    Is the patient prescribed a combination of a benzodiazepine and opioid?  No    Last Urine Drug Screen / ordered today: Yes  Recent Results (from the past 8760 hour(s))   OPIATE/OPIOID/BENZO PRESCRIPTION COMPLIANCE    Collection Time: 03/08/23  7:13 AM   Result Value Ref Range    DRUG SCREEN COMMENT URINE SEE BELOW     Creatine, Urine 70.3 mg/dL    Amphetamine Screen, Urine PRESUMPTIVE NEGATIVE NEGATIVE    Barbiturate Screen, Urine PRESUMPTIVE NEGATIVE NEGATIVE    Cannabinoid Screen, Urine PRESUMPTIVE NEGATIVE NEGATIVE    Cocaine Screen, Urine PRESUMPTIVE NEGATIVE NEGATIVE    PCP Screen, Urine PRESUMPTIVE NEGATIVE NEGATIVE    7-Aminoclonazepam <25 Cutoff <25 ng/mL    Alpha-Hydroxyalprazolam 144 (A) Cutoff <25 ng/mL    Alpha-Hydroxymidazolam <25 Cutoff <25 ng/mL    Alprazolam 112 (A) Cutoff <25 ng/mL    Chlordiazepoxide <25 Cutoff <25 ng/mL    Clonazepam <25 Cutoff <25 ng/mL     Diazepam <25 Cutoff <25 ng/mL    Lorazepam <25 Cutoff <25 ng/mL    Midazolam <25 Cutoff <25 ng/mL    Nordiazepam <25 Cutoff <25 ng/mL    Oxazepam <25 Cutoff <25 ng/mL    Temazepam <25 Cutoff <25 ng/mL    Zolpidem <25 Cutoff <25 ng/mL    Zolpidem Metabolite (ZCA) <25 Cutoff <25 ng/mL    6-Acetylmorphine <25 Cutoff <25 ng/mL    Codeine <50 Cutoff <50 ng/mL    Hydrocodone <25 Cutoff <25 ng/mL    Hydromorphone <25 Cutoff <25 ng/mL    Morphine Urine <50 Cutoff <50 ng/mL    Norhydrocodone <25 Cutoff <25 ng/mL    Noroxycodone <25 Cutoff <25 ng/mL    Oxycodone <25 Cutoff <25 ng/mL    Oxymorphone <25 Cutoff <25 ng/mL    Tramadol <50 Cutoff <50 ng/mL    O-Desmethyltramadol <50 Cutoff <50 ng/mL    Fentanyl <2.5 Cutoff<2.5 ng/mL    Norfentanyl <2.5 Cutoff<2.5 ng/mL    METHADONE CONFIRMATION,URINE <25 Cutoff <25 ng/mL    EDDP <25 Cutoff <25 ng/mL     Results are as expected.         Controlled Substance Agreement:  Date of the Last Agreement: 3/8/23  Reviewed Controlled Substance Agreement including but not limited to the benefits, risks, and alternatives to treatment with a Controlled Substance medication(s).    Benzodiazepines:  What is the patient's goal of therapy? Able to interact others  Is this being achieved with current treatment? Yes    REDD-7:  Over the last 2 weeks, how often have you been bothered by any of the following problems?  Feeling nervous, anxious, or on edge: 1  Not being able to stop or control worryin  Worrying too much about different things: 0  Trouble relaxin  Being so restless that it is hard to sit still: 0  Becoming easily annoyed or irritable: 0  Feeling afraid as if something awful might happen: 0  REDD-7 Total Score: 2        Activities of Daily Living:   Is your overall impression that this patient is benefiting (symptom reduction outweighs side effects) from benzodiazepine therapy? Yes     1. Physical Functioning: Same  2. Family Relationship: Better  3. Social Relationship: Better  4.  "Mood: Better  5. Sleep Patterns: Better  6. Overall Function: Better      Patient Care Team:  Pavel Jaramillo DO as PCP - General (Family Medicine)  Petar Rodriguez DO as PCP - [employer] PCP  Pavel Thompson DO as Referring Physician (Orthopaedic Surgery)     Review of Systems   Constitutional:  Negative for chills, fatigue and fever.   HENT:  Negative for congestion, ear discharge, ear pain, hearing loss, nosebleeds, sore throat, tinnitus and trouble swallowing.    Eyes:  Negative for pain, redness and visual disturbance.   Respiratory:  Negative for cough, chest tightness, shortness of breath and wheezing.    Cardiovascular:  Negative for chest pain, palpitations and leg swelling.   Gastrointestinal:  Negative for abdominal pain, blood in stool, constipation, diarrhea, nausea and vomiting.   Endocrine: Negative for cold intolerance, heat intolerance, polydipsia, polyphagia and polyuria.   Genitourinary:  Negative for dysuria, frequency, hematuria and urgency.   Musculoskeletal:  Negative for arthralgias, back pain and gait problem.   Skin:  Negative for color change and rash.   Neurological:  Positive for numbness. Negative for dizziness, tremors, syncope, weakness and headaches.   Hematological:  Negative for adenopathy. Does not bruise/bleed easily.   Psychiatric/Behavioral:  Positive for dysphoric mood. The patient is not nervous/anxious.        Objective   Vitals:  /72   Pulse 86   Ht 1.651 m (5' 5\")   Wt 71.2 kg (157 lb)   SpO2 98%   BMI 26.13 kg/m²       Physical Exam  Vitals and nursing note reviewed.   Constitutional:       General: She is not in acute distress.     Appearance: Normal appearance.   HENT:      Head: Normocephalic and atraumatic.      Right Ear: Tympanic membrane, ear canal and external ear normal.      Left Ear: Tympanic membrane, ear canal and external ear normal.      Nose: Nose normal.      Mouth/Throat:      Mouth: Mucous membranes are moist.      " Pharynx: Oropharynx is clear.   Eyes:      Extraocular Movements: Extraocular movements intact.      Conjunctiva/sclera: Conjunctivae normal.      Pupils: Pupils are equal, round, and reactive to light.   Neck:      Vascular: No carotid bruit.   Cardiovascular:      Rate and Rhythm: Normal rate and regular rhythm.      Pulses: Normal pulses.      Heart sounds: Normal heart sounds. No murmur heard.  Pulmonary:      Effort: Pulmonary effort is normal.      Breath sounds: Normal breath sounds. No wheezing, rhonchi or rales.   Abdominal:      General: Abdomen is flat. Bowel sounds are normal.      Palpations: Abdomen is soft. There is no mass.   Musculoskeletal:         General: Normal range of motion.      Cervical back: Normal range of motion and neck supple.   Lymphadenopathy:      Cervical: No cervical adenopathy.   Skin:     Capillary Refill: Capillary refill takes less than 2 seconds.   Neurological:      General: No focal deficit present.      Mental Status: She is alert and oriented to person, place, and time.      Cranial Nerves: No cranial nerve deficit.      Motor: No weakness.      Deep Tendon Reflexes: Reflexes normal.   Psychiatric:         Mood and Affect: Mood normal.         Behavior: Behavior normal.         Diabetic foot exam:   Left: Pulses Dorsalis Pedis:  present  Posterior Tibial:  present   Reflexes 2+    Filament test present    Right: Pulses Dorsalis Pedis:  present  Posterior Tibial:  present   Reflexes 2+    Filament test present      Assessment/Plan   Problem List Items Addressed This Visit       Vitamin D deficiency    Familial hypercholesteremia    Diabetes mellitus, type II (CMS/HCC)    Depression, recurrent (CMS/HCC)    Benign essential hypertension     Other Visit Diagnoses       Routine general medical examination at health care facility    -  Primary    Encounter for screening for other disorder        Cardiac risk counseling        Opiate analgesic contract exists             Renewed/continued rest of medications  Checked  labs  Updated Health Maintenance in HPI section  HTN, controlled- continue meds, low sodium diet    Overweight- caloric restriction, increase CV exercise    Hyperlipidemia- continue meds, low fat/cholesterol diet    GERD- omeprazole, limit triggers    DM, type 2- avoid sugars, low carbs, increase CV exercise, continue meds, check feet daily    MDD/REDD- Fluoxetine, xanax    Vitamin D- supplement, follow level    Cardiovascular risk discussed and, if needed, lifestyle modifications recommended, including nutritional choices, exercise, and elimination of habits contributing to risk. We agreed on a plan to reduce the current cardiovascular risk. See the ASCVD Risk  Plus (44.8%) for data discussed regarding risk and risk reduction opportunities. Aspirin use/disuse was discussed after reviewing updated guidelines

## 2024-02-22 DIAGNOSIS — B37.31 VULVOVAGINITIS CANDIDA ALBICANS: Primary | ICD-10-CM

## 2024-02-22 DIAGNOSIS — E55.9 VITAMIN D DEFICIENCY: ICD-10-CM

## 2024-02-22 RX ORDER — CHOLECALCIFEROL (VITAMIN D3) 1250 MCG
50000 TABLET ORAL
Qty: 12 TABLET | Refills: 3 | Status: SHIPPED | OUTPATIENT
Start: 2024-02-22 | End: 2024-05-22 | Stop reason: SDUPTHER

## 2024-02-22 ASSESSMENT — ENCOUNTER SYMPTOMS
DYSPHORIC MOOD: 1
DIZZINESS: 0
FATIGUE: 0
CHEST TIGHTNESS: 0
FREQUENCY: 0
HEMATURIA: 0
ABDOMINAL PAIN: 0
EYE PAIN: 0
HEADACHES: 0
BRUISES/BLEEDS EASILY: 0
POLYPHAGIA: 0
ADENOPATHY: 0
WHEEZING: 0
ARTHRALGIAS: 0
BLOOD IN STOOL: 0
FEVER: 0
TROUBLE SWALLOWING: 0
NAUSEA: 0
VOMITING: 0
NERVOUS/ANXIOUS: 0
SHORTNESS OF BREATH: 0
DYSURIA: 0
COLOR CHANGE: 0
TREMORS: 0
EYE REDNESS: 0
DIARRHEA: 0
WEAKNESS: 0
PALPITATIONS: 0
SORE THROAT: 0
CONSTIPATION: 0
POLYDIPSIA: 0
COUGH: 0
NUMBNESS: 1
CHILLS: 0
BACK PAIN: 0

## 2024-02-29 LAB
1OH-MIDAZOLAM UR CFM-MCNC: <25 NG/ML
7AMINOCLONAZEPAM UR CFM-MCNC: <25 NG/ML
A-OH ALPRAZ UR CFM-MCNC: 225 NG/ML
ALPRAZ UR CFM-MCNC: 121 NG/ML
CHLORDIAZEP UR CFM-MCNC: <25 NG/ML
CLONAZEPAM UR CFM-MCNC: <25 NG/ML
DIAZEPAM UR CFM-MCNC: <25 NG/ML
LORAZEPAM UR CFM-MCNC: <25 NG/ML
MIDAZOLAM UR CFM-MCNC: <25 NG/ML
NORDIAZEPAM UR CFM-MCNC: <25 NG/ML
OXAZEPAM UR CFM-MCNC: <25 NG/ML
TEMAZEPAM UR CFM-MCNC: <25 NG/ML

## 2024-03-06 DIAGNOSIS — E11.9 TYPE 2 DIABETES MELLITUS WITHOUT COMPLICATION, WITHOUT LONG-TERM CURRENT USE OF INSULIN (MULTI): ICD-10-CM

## 2024-03-06 RX ORDER — LANCETS
EACH MISCELLANEOUS
Qty: 204 EACH | Refills: 3 | Status: SHIPPED | OUTPATIENT
Start: 2024-03-06

## 2024-03-27 DIAGNOSIS — F41.1 GAD (GENERALIZED ANXIETY DISORDER): ICD-10-CM

## 2024-03-28 RX ORDER — ALPRAZOLAM 0.5 MG/1
0.5 TABLET ORAL 3 TIMES DAILY PRN
Qty: 90 TABLET | Refills: 0 | Status: SHIPPED | OUTPATIENT
Start: 2024-03-28 | End: 2024-03-29 | Stop reason: SDUPTHER

## 2024-03-29 DIAGNOSIS — F41.1 GAD (GENERALIZED ANXIETY DISORDER): ICD-10-CM

## 2024-03-29 RX ORDER — ALPRAZOLAM 0.5 MG/1
0.5 TABLET ORAL 3 TIMES DAILY PRN
Qty: 90 TABLET | Refills: 0 | Status: SHIPPED | OUTPATIENT
Start: 2024-03-29 | End: 2024-05-22 | Stop reason: SDUPTHER

## 2024-04-17 DIAGNOSIS — K21.00 GASTROESOPHAGEAL REFLUX DISEASE WITH ESOPHAGITIS WITHOUT HEMORRHAGE: ICD-10-CM

## 2024-04-17 RX ORDER — OMEPRAZOLE 20 MG/1
20 CAPSULE, DELAYED RELEASE ORAL
Qty: 90 CAPSULE | Refills: 3 | Status: SHIPPED | OUTPATIENT
Start: 2024-04-17

## 2024-05-22 ENCOUNTER — OFFICE VISIT (OUTPATIENT)
Dept: PRIMARY CARE | Facility: CLINIC | Age: 79
End: 2024-05-22
Payer: MEDICARE

## 2024-05-22 VITALS
WEIGHT: 160 LBS | DIASTOLIC BLOOD PRESSURE: 72 MMHG | HEIGHT: 65 IN | OXYGEN SATURATION: 96 % | HEART RATE: 73 BPM | BODY MASS INDEX: 26.66 KG/M2 | SYSTOLIC BLOOD PRESSURE: 128 MMHG

## 2024-05-22 DIAGNOSIS — F33.9 DEPRESSION, RECURRENT (CMS-HCC): ICD-10-CM

## 2024-05-22 DIAGNOSIS — K12.1 ULCER OF BUCCAL MUCOSA: Primary | ICD-10-CM

## 2024-05-22 DIAGNOSIS — F41.1 GAD (GENERALIZED ANXIETY DISORDER): ICD-10-CM

## 2024-05-22 DIAGNOSIS — E55.9 VITAMIN D DEFICIENCY: ICD-10-CM

## 2024-05-22 PROCEDURE — 1123F ACP DISCUSS/DSCN MKR DOCD: CPT | Performed by: FAMILY MEDICINE

## 2024-05-22 PROCEDURE — 99214 OFFICE O/P EST MOD 30 MIN: CPT | Performed by: FAMILY MEDICINE

## 2024-05-22 PROCEDURE — 3074F SYST BP LT 130 MM HG: CPT | Performed by: FAMILY MEDICINE

## 2024-05-22 PROCEDURE — 3078F DIAST BP <80 MM HG: CPT | Performed by: FAMILY MEDICINE

## 2024-05-22 PROCEDURE — 1160F RVW MEDS BY RX/DR IN RCRD: CPT | Performed by: FAMILY MEDICINE

## 2024-05-22 PROCEDURE — 1159F MED LIST DOCD IN RCRD: CPT | Performed by: FAMILY MEDICINE

## 2024-05-22 PROCEDURE — 1158F ADVNC CARE PLAN TLK DOCD: CPT | Performed by: FAMILY MEDICINE

## 2024-05-22 PROCEDURE — 1036F TOBACCO NON-USER: CPT | Performed by: FAMILY MEDICINE

## 2024-05-22 RX ORDER — ALPRAZOLAM 0.5 MG/1
0.5 TABLET ORAL 3 TIMES DAILY PRN
Qty: 90 TABLET | Refills: 0 | Status: SHIPPED | OUTPATIENT
Start: 2024-05-22

## 2024-05-22 RX ORDER — ASPIRIN 325 MG
50000 TABLET, DELAYED RELEASE (ENTERIC COATED) ORAL
Qty: 12 CAPSULE | Refills: 3 | Status: SHIPPED | OUTPATIENT
Start: 2024-05-26

## 2024-05-22 RX ORDER — ASPIRIN 325 MG
50000 TABLET, DELAYED RELEASE (ENTERIC COATED) ORAL
COMMUNITY
Start: 2024-05-13 | End: 2024-05-22 | Stop reason: SDUPTHER

## 2024-05-22 ASSESSMENT — ANXIETY QUESTIONNAIRES
7. FEELING AFRAID AS IF SOMETHING AWFUL MIGHT HAPPEN: NOT AT ALL
2. NOT BEING ABLE TO STOP OR CONTROL WORRYING: SEVERAL DAYS
5. BEING SO RESTLESS THAT IT IS HARD TO SIT STILL: NOT AT ALL
6. BECOMING EASILY ANNOYED OR IRRITABLE: NOT AT ALL
GAD7 TOTAL SCORE: 4
1. FEELING NERVOUS, ANXIOUS, OR ON EDGE: SEVERAL DAYS
IF YOU CHECKED OFF ANY PROBLEMS ON THIS QUESTIONNAIRE, HOW DIFFICULT HAVE THESE PROBLEMS MADE IT FOR YOU TO DO YOUR WORK, TAKE CARE OF THINGS AT HOME, OR GET ALONG WITH OTHER PEOPLE: SOMEWHAT DIFFICULT
3. WORRYING TOO MUCH ABOUT DIFFERENT THINGS: SEVERAL DAYS
4. TROUBLE RELAXING: SEVERAL DAYS

## 2024-05-22 NOTE — PROGRESS NOTES
Subjective   Patient ID: Aury Kimball is a 79 y.o. female who presents for Follow-up (MEDICATION FOLLOW UP ).  HPI  No SE meds  No CP, SOB, palpitations, weakness, dizziness, HA, vision changes  Some tingling in feet at night     Mood is up and down  No hopeless, worthless    Has a sore in right side of mouth  Painful when anything hits the area  No fever, chills     Ophtho- about 1 year  Dentist- 2022  [facility]- 2/24  Colonoscopy- 12/15- will consider  PARTH-  FOBT-  UA/Micro- 3/23  Mammo- refused  DEXA- refused  PAP- N/A  Lung CT-  Coronary Calcium CT Score-  AAA-  EKG-  RSV-  Prevnar-  Flu- 9/23  Shingrix- 2/1/24  Td-  Hep C-  Advance Directives- has them  ETOH screen- 2/21/24  CV risk- 2/21/24  AWV- 2/21/24  MDD screen- 2/21/24    OARRS:  Pavel Jaramillo,  on 5/22/2024  8:51 AM  I have personally reviewed the OARRS report for Aury Kimball. I have considered the risks of abuse, dependence, addiction and diversion and I believe that it is clinically appropriate for Aury Kimball to be prescribed this medication    Is the patient prescribed a combination of a benzodiazepine and opioid?  Yes, I feel it is clincially indicated to continue the medication and have discussed with the patient risks/benefits/alternatives.    Last Urine Drug Screen / ordered today: No  Recent Results (from the past 8760 hour(s))   Benzodiazepine Confirmation, Urine    Collection Time: 02/21/24  9:14 AM   Result Value Ref Range    Clonazepam <25 <25 ng/mL    7-Aminoclonazepam <25 <25 ng/mL    Alprazolam 121 (H) <25 ng/mL    Alpha-Hydroxyalprazolam 225 (H) <25 ng/mL    Midazolam <25 <25 ng/mL    Alpha-Hydroxymidazolam <25 <25 ng/mL    Chlordiazepoxide <25 <25 ng/mL    Diazepam <25 <25 ng/mL    Nordiazepam <25 <25 ng/mL    Temazepam <25 <25 ng/mL    Oxazepam <25 <25 ng/mL    Lorazepam <25 <25 ng/mL   Drug Screen, Urine With Reflex to Confirmation    Collection Time: 02/21/24  9:14 AM   Result Value Ref Range    Amphetamine Screen, Urine  Presumptive Negative Presumptive Negative    Barbiturate Screen, Urine Presumptive Negative Presumptive Negative    Benzodiazepines Screen, Urine Presumptive Positive (A) Presumptive Negative    Cannabinoid Screen, Urine Presumptive Negative Presumptive Negative    Cocaine Metabolite Screen, Urine Presumptive Negative Presumptive Negative    Fentanyl Screen, Urine Presumptive Negative Presumptive Negative    Opiate Screen, Urine Presumptive Negative Presumptive Negative    Oxycodone Screen, Urine Presumptive Negative Presumptive Negative    PCP Screen, Urine Presumptive Negative Presumptive Negative     Results are as expected.         Controlled Substance Agreement:  Date of the Last Agreement: 3/8/23  Reviewed Controlled Substance Agreement including but not limited to the benefits, risks, and alternatives to treatment with a Controlled Substance medication(s).    Benzodiazepines:  What is the patient's goal of therapy? Better interaction with others  Is this being achieved with current treatment? Yes    REDD-7:  Over the last 2 weeks, how often have you been bothered by any of the following problems?  Feeling nervous, anxious, or on edge: 1  Not being able to stop or control worryin  Worrying too much about different things: 1  Trouble relaxin  Being so restless that it is hard to sit still: 0  Becoming easily annoyed or irritable: 0  Feeling afraid as if something awful might happen: 0  REDD-7 Total Score: 4        Activities of Daily Living:   Is your overall impression that this patient is benefiting (symptom reduction outweighs side effects) from benzodiazepine therapy? Yes     1. Physical Functioning: Same  2. Family Relationship: Better  3. Social Relationship: Better  4. Mood: Better  5. Sleep Patterns: Better  6. Overall Function: Better    Current Outpatient Medications:     Accu-Chek Fastclix Lancet Drum misc, TEST BLOOD SUGAR TWO TIMES DAILY, Disp: 204 each, Rfl: 3    alcohol swabs pads,  medicated, Apply 1 Swab topically in the morning and 1 Swab before bedtime. Use to check blood glucose twice daily.., Disp: 200 each, Rfl: 3    allopurinol (Zyloprim) 100 mg tablet, Take 1 tablet (100 mg) by mouth., Disp: , Rfl:     ALPRAZolam (Xanax) 0.5 mg tablet, Take 1 tablet (0.5 mg) by mouth 3 times a day as needed for anxiety., Disp: 90 tablet, Rfl: 0    betamethasone dipropionate 0.05 % cream, Apply topically 2 times a day., Disp: , Rfl:     blood sugar diagnostic (Accu-Chek Guide test strips) strip, TEST TWO TIMES DAILY, Disp: 200 strip, Rfl: 3    blood-glucose meter (Accu-Chek Guide Glucose Meter) misc, USE AS DIRECTED, Disp: 1 each, Rfl: 3    [START ON 5/26/2024] cholecalciferol (Vitamin D-3) 50,000 unit capsule, Take 1 capsule (50,000 Units) by mouth 1 (one) time per week., Disp: 12 capsule, Rfl: 3    colchicine 0.6 mg tablet, Take 1 tablet (0.6 mg) by mouth once daily., Disp: , Rfl:     diphenhydramine/Maalox/lidocaine (Magic Mouthwash) - Compounded - Outpatient, Swish and spit 10 mL 4 times a day as needed (Sore mouth)., Disp: 240 mL, Rfl: 0    FLUoxetine (PROzac) 20 mg tablet, Take 1 tablet (20 mg) by mouth once daily., Disp: 90 tablet, Rfl: 3    fluticasone (Flonase) 50 mcg/actuation nasal spray, Administer 1 spray into each nostril once daily. Shake gently. Before first use, prime pump. After use, clean tip and replace cap., Disp: 16 g, Rfl: 11    ketotifen (Zaditor) 0.025 % (0.035 %) ophthalmic solution, Administer 1 drop into both eyes 2 times a day., Disp: 5 mL, Rfl: 0    lansoprazole (Prevacid) 30 mg DR capsule, Take 1 capsule (30 mg) by mouth once daily in the morning. Take before meals., Disp: , Rfl:     metFORMIN (Glucophage) 500 mg tablet, Take 1 tablet (500 mg) by mouth 2 times a day with meals., Disp: 180 tablet, Rfl: 1    mirtazapine (Remeron) 15 mg tablet, TAKE 1/2 TABLET ONCE DAILY AT BEDTIME., Disp: 45 tablet, Rfl: 1    omeprazole (PriLOSEC) 20 mg DR capsule, TAKE 1 CAPSULE EVERY DAY  BEFORE A MEAL, Disp: 90 capsule, Rfl: 3    rosuvastatin (Crestor) 20 mg tablet, TAKE 1 TABLET EVERY DAY, Disp: 90 tablet, Rfl: 3    valsartan-hydrochlorothiazide (Diovan-HCT) 80-12.5 mg tablet, Take 1 tablet by mouth once daily., Disp: 90 tablet, Rfl: 1    vit C-zinc citrate-elderberry 30-1.1-25 mg tablet,chewable, Chew 1 tablet once daily., Disp: , Rfl:    Past Surgical History:   Procedure Laterality Date    APPENDECTOMY  07/05/2013    Appendectomy    CARDIAC CATHETERIZATION  06/07/2016    Cardiac Cath Procedure Summary    ELBOW SURGERY  07/05/2013    Elbow Surgery    GALLBLADDER SURGERY  07/05/2013    Gallbladder Surgery    NECK SURGERY  07/05/2013    Neck Surgery    OTHER SURGICAL HISTORY  07/05/2013    Hysterotomy (Obstetrical)    OTHER SURGICAL HISTORY  01/08/2019    Complete colonoscopy    SHOULDER SURGERY  07/05/2013    Shoulder Surgery Right      Past Medical History:   Diagnosis Date    Abdominal distension (gaseous) 10/02/2017    Bloating    Acute maxillary sinusitis, unspecified 12/21/2018    Acute non-recurrent maxillary sinusitis    Body mass index (BMI) 26.0-26.9, adult 01/29/2021    Body mass index (BMI) of 26.0 to 26.9 in adult    Body mass index (BMI) 28.0-28.9, adult 06/10/2020    Body mass index (BMI) of 28.0 to 28.9 in adult    Body mass index (BMI) 29.0-29.9, adult 03/09/2019    BMI 29.0-29.9,adult    COVID-19 virus infection 02/18/2023    Diverticulosis of intestine, part unspecified, without perforation or abscess without bleeding 07/01/2019    Diverticulosis    Encounter for screening for diabetes mellitus 11/14/2016    Encounter for screening for diabetes mellitus    Fatty (change of) liver, not elsewhere classified 01/15/2019    Nonalcoholic fatty liver disease    Left lower quadrant pain 07/01/2019    Abdominal pain, LLQ (left lower quadrant)    Other gastritis without bleeding 05/08/2017    Bile reflux gastritis    Other intervertebral disc displacement, thoracic region 07/05/2013     Thoracic disc herniation    Pain in unspecified foot 2018    Pains, foot    Personal history of other (healed) physical injury and trauma 2017    History of traumatic injury of head    Personal history of other diseases of the respiratory system 10/10/2018    History of allergic rhinitis    Personal history of other diseases of the respiratory system 2020    History of bronchitis    Personal history of other diseases of the respiratory system 2016    History of laryngitis    Personal history of other endocrine, nutritional and metabolic disease 10/10/2018    History of hyperglycemia    Personal history of other endocrine, nutritional and metabolic disease 2018    History of hyperglycemia    Personal history of other infectious and parasitic diseases 2020    History of candidiasis of vagina    Personal history of other mental and behavioral disorders 2019    History of depression    Personal history of other mental and behavioral disorders 2013    History of anxiety disorder    Personal history of other specified conditions 2016    History of abdominal pain    Prediabetes 04/15/2018    Pre-diabetes    Spondylosis without myelopathy or radiculopathy, cervical region     Cervical spondylosis    Spondylosis without myelopathy or radiculopathy, lumbar region 2013    Lumbar spondylosis     Social History     Tobacco Use    Smoking status: Former     Current packs/day: 0.00     Types: Cigarettes     Quit date:      Years since quittin.4    Smokeless tobacco: Former   Vaping Use    Vaping status: Never Used   Substance Use Topics    Alcohol use: Not Currently    Drug use: Never      Family History   Problem Relation Name Age of Onset    Severe combined immunodeficiency Mother      Alzheimer's disease Father      Breast cancer Sister      Colon cancer Sister      Other (Malignant Neoplasm of urinar bladder) Sister      Colon cancer Brother      Skin cancer  "Brother        Review of Systems    Objective   /72   Pulse 73   Ht 1.651 m (5' 5\")   Wt 72.6 kg (160 lb)   SpO2 96%   BMI 26.63 kg/m²    Physical Exam  Vitals and nursing note reviewed.   Constitutional:       General: She is not in acute distress.     Appearance: Normal appearance.   HENT:      Head: Normocephalic and atraumatic.      Mouth/Throat:      Comments: Slight redness on right lower, posterior buccal mucosa  Eyes:      Extraocular Movements: Extraocular movements intact.      Conjunctiva/sclera: Conjunctivae normal.      Pupils: Pupils are equal, round, and reactive to light.   Neck:      Vascular: No carotid bruit.   Cardiovascular:      Rate and Rhythm: Normal rate and regular rhythm.      Pulses: Normal pulses.      Heart sounds: Normal heart sounds. No murmur heard.  Pulmonary:      Effort: Pulmonary effort is normal.      Breath sounds: Normal breath sounds. No wheezing, rhonchi or rales.   Musculoskeletal:         General: Normal range of motion.      Cervical back: Normal range of motion and neck supple.   Lymphadenopathy:      Cervical: No cervical adenopathy.   Skin:     Capillary Refill: Capillary refill takes less than 2 seconds.   Neurological:      General: No focal deficit present.      Mental Status: She is alert and oriented to person, place, and time.   Psychiatric:         Mood and Affect: Mood normal.         Behavior: Behavior normal.         Assessment/Plan   Problem List Items Addressed This Visit       Vitamin D deficiency    Relevant Medications    cholecalciferol (Vitamin D-3) 50,000 unit capsule (Start on 5/26/2024)    REDD (generalized anxiety disorder)    Relevant Medications    ALPRAZolam (Xanax) 0.5 mg tablet    Depression, recurrent (CMS-HCC)     Other Visit Diagnoses       Ulcer of buccal mucosa    -  Primary    Relevant Medications    diphenhydramine/Maalox/lidocaine (Magic Mouthwash) - Compounded - Outpatient        REDD- continue xanax, limit caffeine    MDD- " Prozac, CV exercise    Vitamin D- supplement, follow level    Buccal mucosa ulcer- magic mouthwash, stop listerine    Patient understands and agrees with treatment plan    Pavel Jaramillo, DO

## 2024-05-23 ENCOUNTER — TELEPHONE (OUTPATIENT)
Dept: PRIMARY CARE | Facility: CLINIC | Age: 79
End: 2024-05-23
Payer: MEDICARE

## 2024-05-23 NOTE — TELEPHONE ENCOUNTER
Pt states the pharmacy that you sent the mouthwash to doesn't compound. She wants to know if there is something else you can send in for her?

## 2024-06-24 DIAGNOSIS — F41.1 GAD (GENERALIZED ANXIETY DISORDER): ICD-10-CM

## 2024-06-24 RX ORDER — ALPRAZOLAM 0.5 MG/1
0.5 TABLET ORAL 3 TIMES DAILY PRN
Qty: 90 TABLET | Refills: 0 | Status: SHIPPED | OUTPATIENT
Start: 2024-06-24 | End: 2024-06-24 | Stop reason: SDUPTHER

## 2024-06-24 RX ORDER — ALPRAZOLAM 0.5 MG/1
0.5 TABLET ORAL 3 TIMES DAILY PRN
Qty: 90 TABLET | Refills: 0 | Status: SHIPPED | OUTPATIENT
Start: 2024-06-24

## 2024-06-29 DIAGNOSIS — I10 BENIGN ESSENTIAL HYPERTENSION: ICD-10-CM

## 2024-07-01 RX ORDER — VALSARTAN AND HYDROCHLOROTHIAZIDE 80; 12.5 MG/1; MG/1
1 TABLET, FILM COATED ORAL DAILY
Qty: 90 TABLET | Refills: 3 | Status: SHIPPED | OUTPATIENT
Start: 2024-07-01

## 2024-07-14 DIAGNOSIS — F41.1 GAD (GENERALIZED ANXIETY DISORDER): ICD-10-CM

## 2024-07-15 RX ORDER — MIRTAZAPINE 15 MG/1
TABLET, FILM COATED ORAL
Qty: 45 TABLET | Refills: 3 | Status: SHIPPED | OUTPATIENT
Start: 2024-07-15

## 2024-07-25 DIAGNOSIS — E11.9 TYPE 2 DIABETES MELLITUS WITHOUT COMPLICATION, WITHOUT LONG-TERM CURRENT USE OF INSULIN (MULTI): ICD-10-CM

## 2024-07-25 RX ORDER — METFORMIN HYDROCHLORIDE 500 MG/1
500 TABLET ORAL
Qty: 180 TABLET | Refills: 3 | Status: SHIPPED | OUTPATIENT
Start: 2024-07-25

## 2024-08-23 ENCOUNTER — APPOINTMENT (OUTPATIENT)
Dept: PRIMARY CARE | Facility: CLINIC | Age: 79
End: 2024-08-23
Payer: MEDICARE

## 2024-08-23 VITALS
DIASTOLIC BLOOD PRESSURE: 78 MMHG | HEART RATE: 82 BPM | HEIGHT: 65 IN | OXYGEN SATURATION: 97 % | WEIGHT: 161 LBS | SYSTOLIC BLOOD PRESSURE: 128 MMHG | BODY MASS INDEX: 26.82 KG/M2

## 2024-08-23 DIAGNOSIS — R25.2 CRAMPS OF LOWER EXTREMITY: ICD-10-CM

## 2024-08-23 DIAGNOSIS — E78.01 FAMILIAL HYPERCHOLESTEREMIA: ICD-10-CM

## 2024-08-23 DIAGNOSIS — F41.1 GAD (GENERALIZED ANXIETY DISORDER): ICD-10-CM

## 2024-08-23 DIAGNOSIS — I10 BENIGN ESSENTIAL HYPERTENSION: ICD-10-CM

## 2024-08-23 DIAGNOSIS — E55.9 VITAMIN D DEFICIENCY: ICD-10-CM

## 2024-08-23 DIAGNOSIS — E11.9 TYPE 2 DIABETES MELLITUS WITHOUT COMPLICATION, WITHOUT LONG-TERM CURRENT USE OF INSULIN (MULTI): Primary | ICD-10-CM

## 2024-08-23 PROBLEM — J01.01 ACUTE RECURRENT MAXILLARY SINUSITIS: Status: RESOLVED | Noted: 2023-02-18 | Resolved: 2024-08-23

## 2024-08-23 LAB
25(OH)D3 SERPL-MCNC: 84 NG/ML (ref 30–100)
ALBUMIN SERPL BCP-MCNC: 4.6 G/DL (ref 3.4–5)
ALP SERPL-CCNC: 102 U/L (ref 33–136)
ALT SERPL W P-5'-P-CCNC: 26 U/L (ref 7–45)
ANION GAP SERPL CALC-SCNC: 16 MMOL/L (ref 10–20)
AST SERPL W P-5'-P-CCNC: 27 U/L (ref 9–39)
BILIRUB SERPL-MCNC: 0.6 MG/DL (ref 0–1.2)
BUN SERPL-MCNC: 17 MG/DL (ref 6–23)
CALCIUM SERPL-MCNC: 9.9 MG/DL (ref 8.6–10.3)
CHLORIDE SERPL-SCNC: 100 MMOL/L (ref 98–107)
CHOLEST SERPL-MCNC: 238 MG/DL (ref 0–199)
CHOLESTEROL/HDL RATIO: 4.3
CO2 SERPL-SCNC: 30 MMOL/L (ref 21–32)
CREAT SERPL-MCNC: 0.84 MG/DL (ref 0.5–1.05)
EGFRCR SERPLBLD CKD-EPI 2021: 71 ML/MIN/1.73M*2
GLUCOSE SERPL-MCNC: 108 MG/DL (ref 74–99)
HDLC SERPL-MCNC: 55.6 MG/DL
LDLC SERPL CALC-MCNC: 123 MG/DL
MAGNESIUM SERPL-MCNC: 2.03 MG/DL (ref 1.6–2.4)
NON HDL CHOLESTEROL: 182 MG/DL (ref 0–149)
POC HEMOGLOBIN A1C: 6.3 % (ref 4.2–6.5)
POTASSIUM SERPL-SCNC: 4.9 MMOL/L (ref 3.5–5.3)
PROT SERPL-MCNC: 7.1 G/DL (ref 6.4–8.2)
SODIUM SERPL-SCNC: 141 MMOL/L (ref 136–145)
TRIGL SERPL-MCNC: 296 MG/DL (ref 0–149)
VLDL: 59 MG/DL (ref 0–40)

## 2024-08-23 PROCEDURE — 99214 OFFICE O/P EST MOD 30 MIN: CPT | Performed by: FAMILY MEDICINE

## 2024-08-23 PROCEDURE — 82306 VITAMIN D 25 HYDROXY: CPT

## 2024-08-23 PROCEDURE — 3074F SYST BP LT 130 MM HG: CPT | Performed by: FAMILY MEDICINE

## 2024-08-23 PROCEDURE — 3078F DIAST BP <80 MM HG: CPT | Performed by: FAMILY MEDICINE

## 2024-08-23 PROCEDURE — 83735 ASSAY OF MAGNESIUM: CPT

## 2024-08-23 PROCEDURE — 80053 COMPREHEN METABOLIC PANEL: CPT

## 2024-08-23 PROCEDURE — 1159F MED LIST DOCD IN RCRD: CPT | Performed by: FAMILY MEDICINE

## 2024-08-23 PROCEDURE — 1160F RVW MEDS BY RX/DR IN RCRD: CPT | Performed by: FAMILY MEDICINE

## 2024-08-23 PROCEDURE — 83036 HEMOGLOBIN GLYCOSYLATED A1C: CPT | Performed by: FAMILY MEDICINE

## 2024-08-23 PROCEDURE — 80061 LIPID PANEL: CPT

## 2024-08-23 RX ORDER — ALPRAZOLAM 0.5 MG/1
0.5 TABLET ORAL 3 TIMES DAILY PRN
Qty: 90 TABLET | Refills: 1 | Status: SHIPPED | OUTPATIENT
Start: 2024-08-23

## 2024-08-23 RX ORDER — ASPIRIN 325 MG
50000 TABLET, DELAYED RELEASE (ENTERIC COATED) ORAL
Qty: 12 CAPSULE | Refills: 3 | Status: SHIPPED | OUTPATIENT
Start: 2024-08-25

## 2024-08-23 ASSESSMENT — ANXIETY QUESTIONNAIRES
2. NOT BEING ABLE TO STOP OR CONTROL WORRYING: SEVERAL DAYS
GAD7 TOTAL SCORE: 5
4. TROUBLE RELAXING: SEVERAL DAYS
3. WORRYING TOO MUCH ABOUT DIFFERENT THINGS: SEVERAL DAYS
1. FEELING NERVOUS, ANXIOUS, OR ON EDGE: SEVERAL DAYS
IF YOU CHECKED OFF ANY PROBLEMS ON THIS QUESTIONNAIRE, HOW DIFFICULT HAVE THESE PROBLEMS MADE IT FOR YOU TO DO YOUR WORK, TAKE CARE OF THINGS AT HOME, OR GET ALONG WITH OTHER PEOPLE: SOMEWHAT DIFFICULT
5. BEING SO RESTLESS THAT IT IS HARD TO SIT STILL: SEVERAL DAYS
6. BECOMING EASILY ANNOYED OR IRRITABLE: NOT AT ALL
7. FEELING AFRAID AS IF SOMETHING AWFUL MIGHT HAPPEN: NOT AT ALL

## 2024-08-23 NOTE — PROGRESS NOTES
Subjective   Patient ID: Aury Kimball is a 79 y.o. female who presents for Follow-up (MEDICATION FOLLOW UP ).  HPI  No SE meds  No CP, SOB, palpitations, weakness, dizziness, HA, vision changes  Some tingling in feet at night     Mood is up and down- anxiety fluctuates up and down  No hopeless, worthless  No SI/HI     Ophtho- about 1 year  Dentist- 2022  [place]- 2/24  Colonoscopy- 12/15- will consider  [facility]-  FOBT-  UA/Micro- 2/24  Mammo- refused  DEXA- refused  PAP- N/A  Lung CT-  Coronary Calcium CT Score-  AAA-  EKG-  RSV-  Prevnar-  Flu- 9/23  Shingrix- 2/1/24  Td-  Hep C-  Advance Directives- has them  ETOH screen- 2/21/24  CV risk- 2/21/24  AWV- 2/21/24  MDD screen- 2/21/24    OARRS:  Pavel Jaramillo, DO on 8/23/2024  8:35 AM  I have personally reviewed the OARRS report for Aury Kimball. I have considered the risks of abuse, dependence, addiction and diversion and I believe that it is clinically appropriate for Aury Kimball to be prescribed this medication    Is the patient prescribed a combination of a benzodiazepine and opioid?  No    Last Urine Drug Screen / ordered today: No  Recent Results (from the past 8760 hour(s))   Benzodiazepine Confirmation, Urine    Collection Time: 02/21/24  9:14 AM   Result Value Ref Range    Clonazepam <25 <25 ng/mL    7-Aminoclonazepam <25 <25 ng/mL    Alprazolam 121 (H) <25 ng/mL    Alpha-Hydroxyalprazolam 225 (H) <25 ng/mL    Midazolam <25 <25 ng/mL    Alpha-Hydroxymidazolam <25 <25 ng/mL    Chlordiazepoxide <25 <25 ng/mL    Diazepam <25 <25 ng/mL    Nordiazepam <25 <25 ng/mL    Temazepam <25 <25 ng/mL    Oxazepam <25 <25 ng/mL    Lorazepam <25 <25 ng/mL   Drug Screen, Urine With Reflex to Confirmation    Collection Time: 02/21/24  9:14 AM   Result Value Ref Range    Amphetamine Screen, Urine Presumptive Negative Presumptive Negative    Barbiturate Screen, Urine Presumptive Negative Presumptive Negative    Benzodiazepines Screen, Urine Presumptive Positive (A) Presumptive  Negative    Cannabinoid Screen, Urine Presumptive Negative Presumptive Negative    Cocaine Metabolite Screen, Urine Presumptive Negative Presumptive Negative    Fentanyl Screen, Urine Presumptive Negative Presumptive Negative    Opiate Screen, Urine Presumptive Negative Presumptive Negative    Oxycodone Screen, Urine Presumptive Negative Presumptive Negative    PCP Screen, Urine Presumptive Negative Presumptive Negative     Results are as expected.         Controlled Substance Agreement:  Date of the Last Agreement: 24  Reviewed Controlled Substance Agreement including but not limited to the benefits, risks, and alternatives to treatment with a Controlled Substance medication(s).    Benzodiazepines:  What is the patient's goal of therapy? Able to talk with others  Is this being achieved with current treatment? Yes    REDD-7:  Over the last 2 weeks, how often have you been bothered by any of the following problems?  Feeling nervous, anxious, or on edge: 1  Not being able to stop or control worryin  Worrying too much about different things: 1  Trouble relaxin  Being so restless that it is hard to sit still: 1  Becoming easily annoyed or irritable: 0  Feeling afraid as if something awful might happen: 0  REDD-7 Total Score: 5        Activities of Daily Living:   Is your overall impression that this patient is benefiting (symptom reduction outweighs side effects) from benzodiazepine therapy? Yes     1. Physical Functioning: Same  2. Family Relationship: Better  3. Social Relationship: Better  4. Mood: Better  5. Sleep Patterns: Better  6. Overall Function: Better    Current Outpatient Medications:     Accu-Chek Fastclix Lancet Drum misc, TEST BLOOD SUGAR TWO TIMES DAILY, Disp: 204 each, Rfl: 3    alcohol swabs pads, medicated, Apply 1 Swab topically in the morning and 1 Swab before bedtime. Use to check blood glucose twice daily.., Disp: 200 each, Rfl: 3    allopurinol (Zyloprim) 100 mg tablet, Take 1 tablet  (100 mg) by mouth., Disp: , Rfl:     betamethasone dipropionate 0.05 % cream, Apply topically 2 times a day., Disp: , Rfl:     blood sugar diagnostic (Accu-Chek Guide test strips) strip, TEST TWO TIMES DAILY, Disp: 200 strip, Rfl: 3    blood-glucose meter (Accu-Chek Guide Glucose Meter) misc, USE AS DIRECTED, Disp: 1 each, Rfl: 3    colchicine 0.6 mg tablet, Take 1 tablet (0.6 mg) by mouth once daily., Disp: , Rfl:     FLUoxetine (PROzac) 20 mg tablet, Take 1 tablet (20 mg) by mouth once daily., Disp: 90 tablet, Rfl: 3    fluticasone (Flonase) 50 mcg/actuation nasal spray, Administer 1 spray into each nostril once daily. Shake gently. Before first use, prime pump. After use, clean tip and replace cap., Disp: 16 g, Rfl: 11    ketotifen (Zaditor) 0.025 % (0.035 %) ophthalmic solution, Administer 1 drop into both eyes 2 times a day., Disp: 5 mL, Rfl: 0    lansoprazole (Prevacid) 30 mg DR capsule, Take 1 capsule (30 mg) by mouth once daily in the morning. Take before meals., Disp: , Rfl:     metFORMIN (Glucophage) 500 mg tablet, TAKE 1 TABLET TWICE DAILY WITH MEALS, Disp: 180 tablet, Rfl: 3    mirtazapine (Remeron) 15 mg tablet, TAKE 1/2 TABLET ONE TIME DAILY AT BEDTIME, Disp: 45 tablet, Rfl: 3    omeprazole (PriLOSEC) 20 mg DR capsule, TAKE 1 CAPSULE EVERY DAY BEFORE A MEAL, Disp: 90 capsule, Rfl: 3    rosuvastatin (Crestor) 20 mg tablet, TAKE 1 TABLET EVERY DAY, Disp: 90 tablet, Rfl: 3    valsartan-hydrochlorothiazide (Diovan-HCT) 80-12.5 mg tablet, TAKE 1 TABLET EVERY DAY, Disp: 90 tablet, Rfl: 3    vit C-zinc citrate-elderberry 30-1.1-25 mg tablet,chewable, Chew 1 tablet once daily., Disp: , Rfl:     ALPRAZolam (Xanax) 0.5 mg tablet, Take 1 tablet (0.5 mg) by mouth 3 times a day as needed for anxiety., Disp: 90 tablet, Rfl: 1    [START ON 8/25/2024] cholecalciferol (Vitamin D-3) 50,000 unit capsule, Take 1 capsule (50,000 Units) by mouth 1 (one) time per week., Disp: 12 capsule, Rfl: 3   Past Surgical History:    Procedure Laterality Date    APPENDECTOMY  07/05/2013    Appendectomy    CARDIAC CATHETERIZATION  06/07/2016    Cardiac Cath Procedure Summary    ELBOW SURGERY  07/05/2013    Elbow Surgery    GALLBLADDER SURGERY  07/05/2013    Gallbladder Surgery    NECK SURGERY  07/05/2013    Neck Surgery    OTHER SURGICAL HISTORY  07/05/2013    Hysterotomy (Obstetrical)    OTHER SURGICAL HISTORY  01/08/2019    Complete colonoscopy    SHOULDER SURGERY  07/05/2013    Shoulder Surgery Right      Past Medical History:   Diagnosis Date    Abdominal distension (gaseous) 10/02/2017    Bloating    Acute maxillary sinusitis, unspecified 12/21/2018    Acute non-recurrent maxillary sinusitis    Body mass index (BMI) 26.0-26.9, adult 01/29/2021    Body mass index (BMI) of 26.0 to 26.9 in adult    Body mass index (BMI) 28.0-28.9, adult 06/10/2020    Body mass index (BMI) of 28.0 to 28.9 in adult    Body mass index (BMI) 29.0-29.9, adult 03/09/2019    BMI 29.0-29.9,adult    COVID-19 virus infection 02/18/2023    Diverticulosis of intestine, part unspecified, without perforation or abscess without bleeding 07/01/2019    Diverticulosis    Encounter for screening for diabetes mellitus 11/14/2016    Encounter for screening for diabetes mellitus    Fatty (change of) liver, not elsewhere classified 01/15/2019    Nonalcoholic fatty liver disease    Left lower quadrant pain 07/01/2019    Abdominal pain, LLQ (left lower quadrant)    Other gastritis without bleeding 05/08/2017    Bile reflux gastritis    Other intervertebral disc displacement, thoracic region 07/05/2013    Thoracic disc herniation    Pain in unspecified foot 03/07/2018    Pains, foot    Personal history of other (healed) physical injury and trauma 06/12/2017    History of traumatic injury of head    Personal history of other diseases of the respiratory system 10/10/2018    History of allergic rhinitis    Personal history of other diseases of the respiratory system 12/30/2020    History  "of bronchitis    Personal history of other diseases of the respiratory system 2016    History of laryngitis    Personal history of other endocrine, nutritional and metabolic disease 10/10/2018    History of hyperglycemia    Personal history of other endocrine, nutritional and metabolic disease 2018    History of hyperglycemia    Personal history of other infectious and parasitic diseases 2020    History of candidiasis of vagina    Personal history of other mental and behavioral disorders 2019    History of depression    Personal history of other mental and behavioral disorders 2013    History of anxiety disorder    Personal history of other specified conditions 2016    History of abdominal pain    Prediabetes 04/15/2018    Pre-diabetes    Spondylosis without myelopathy or radiculopathy, cervical region     Cervical spondylosis    Spondylosis without myelopathy or radiculopathy, lumbar region 2013    Lumbar spondylosis     Social History     Tobacco Use    Smoking status: Former     Current packs/day: 0.00     Types: Cigarettes     Quit date:      Years since quittin.6    Smokeless tobacco: Former   Vaping Use    Vaping status: Never Used   Substance Use Topics    Alcohol use: Not Currently    Drug use: Never      Family History   Problem Relation Name Age of Onset    Severe combined immunodeficiency Mother      Alzheimer's disease Father      Breast cancer Sister      Colon cancer Sister      Other (Malignant Neoplasm of urinar bladder) Sister      Colon cancer Brother      Skin cancer Brother        Review of Systems    Objective   /78   Pulse 82   Ht 1.651 m (5' 5\")   Wt 73 kg (161 lb)   SpO2 97%   BMI 26.79 kg/m²    Physical Exam  Vitals and nursing note reviewed.   Constitutional:       General: She is not in acute distress.     Appearance: Normal appearance.   HENT:      Head: Normocephalic and atraumatic.      Right Ear: Tympanic membrane, ear canal " and external ear normal.      Left Ear: Tympanic membrane, ear canal and external ear normal.      Nose: Nose normal.      Mouth/Throat:      Mouth: Mucous membranes are moist.      Pharynx: Oropharynx is clear.   Eyes:      Extraocular Movements: Extraocular movements intact.      Conjunctiva/sclera: Conjunctivae normal.      Pupils: Pupils are equal, round, and reactive to light.   Neck:      Vascular: No carotid bruit.   Cardiovascular:      Rate and Rhythm: Normal rate and regular rhythm.      Pulses: Normal pulses.      Heart sounds: Normal heart sounds. No murmur heard.  Pulmonary:      Effort: Pulmonary effort is normal.      Breath sounds: Normal breath sounds. No wheezing, rhonchi or rales.   Abdominal:      General: Abdomen is flat. Bowel sounds are normal.      Palpations: Abdomen is soft. There is no mass.   Musculoskeletal:         General: Normal range of motion.      Cervical back: Normal range of motion and neck supple.   Lymphadenopathy:      Cervical: No cervical adenopathy.   Skin:     Capillary Refill: Capillary refill takes less than 2 seconds.   Neurological:      General: No focal deficit present.      Mental Status: She is alert and oriented to person, place, and time.      Cranial Nerves: No cranial nerve deficit.      Motor: No weakness.      Deep Tendon Reflexes: Reflexes normal.   Psychiatric:         Mood and Affect: Mood normal.         Behavior: Behavior normal.         Assessment/Plan   Problem List Items Addressed This Visit       Vitamin D deficiency    Relevant Orders    Vitamin D 25-Hydroxy,Total (for eval of Vitamin D levels) (Completed)    REDD (generalized anxiety disorder)    Relevant Medications    ALPRAZolam (Xanax) 0.5 mg tablet    Familial hypercholesteremia    Relevant Orders    Comprehensive Metabolic Panel (Completed)    Diabetes mellitus, type II (Multi) - Primary    Relevant Orders    POCT glycosylated hemoglobin (Hb A1C) manually resulted (Completed)    Benign  essential hypertension    Relevant Orders    Lipid Panel (Completed)    Comprehensive Metabolic Panel (Completed)     Other Visit Diagnoses       Cramps of lower extremity        Relevant Orders    Magnesium (Completed)        Renewed/continued rest of medications  Checked  labs  Updated Health Maintenance in HPI section  HTN, controlled- continue meds, low sodium diet     Overweight- caloric restriction, increase CV exercise     Hyperlipidemia- continue meds, low fat/cholesterol diet     GERD- omeprazole, limit triggers     DM, type 2- avoid sugars, low carbs, increase CV exercise, continue meds, check feet daily     MDD/REDD- Fluoxetine, xanax     Vitamin D- supplement, follow level    Patient understands and agrees with treatment plan    Pavel Jaramillo, DO

## 2024-08-27 DIAGNOSIS — E55.9 VITAMIN D DEFICIENCY: ICD-10-CM

## 2024-08-27 RX ORDER — ASPIRIN 325 MG
50000 TABLET, DELAYED RELEASE (ENTERIC COATED) ORAL
Qty: 12 CAPSULE | Refills: 3 | Status: SHIPPED | OUTPATIENT
Start: 2024-08-27

## 2024-10-21 ENCOUNTER — TELEPHONE (OUTPATIENT)
Dept: PRIMARY CARE | Facility: CLINIC | Age: 79
End: 2024-10-21
Payer: MEDICARE

## 2024-10-21 DIAGNOSIS — K57.92 DIVERTICULITIS: Primary | ICD-10-CM

## 2024-10-21 RX ORDER — CIPROFLOXACIN 500 MG/1
500 TABLET ORAL 2 TIMES DAILY
Qty: 20 TABLET | Refills: 0 | Status: SHIPPED | OUTPATIENT
Start: 2024-10-21 | End: 2024-10-31

## 2024-10-21 RX ORDER — METRONIDAZOLE 500 MG/1
500 TABLET ORAL 2 TIMES DAILY
Qty: 20 TABLET | Refills: 0 | Status: SHIPPED | OUTPATIENT
Start: 2024-10-21 | End: 2024-10-31

## 2024-11-22 ENCOUNTER — APPOINTMENT (OUTPATIENT)
Dept: PRIMARY CARE | Facility: CLINIC | Age: 79
End: 2024-11-22
Payer: MEDICARE

## 2024-11-22 VITALS
BODY MASS INDEX: 26.33 KG/M2 | OXYGEN SATURATION: 98 % | HEIGHT: 65 IN | DIASTOLIC BLOOD PRESSURE: 72 MMHG | WEIGHT: 158 LBS | HEART RATE: 72 BPM | SYSTOLIC BLOOD PRESSURE: 126 MMHG

## 2024-11-22 DIAGNOSIS — F41.1 GAD (GENERALIZED ANXIETY DISORDER): ICD-10-CM

## 2024-11-22 DIAGNOSIS — Z78.0 MENOPAUSE: ICD-10-CM

## 2024-11-22 DIAGNOSIS — E78.01 FAMILIAL HYPERCHOLESTEREMIA: ICD-10-CM

## 2024-11-22 DIAGNOSIS — I10 BENIGN ESSENTIAL HYPERTENSION: ICD-10-CM

## 2024-11-22 DIAGNOSIS — R10.32 LLQ ABDOMINAL PAIN: ICD-10-CM

## 2024-11-22 DIAGNOSIS — K57.92 DIVERTICULITIS: Primary | ICD-10-CM

## 2024-11-22 DIAGNOSIS — E11.9 TYPE 2 DIABETES MELLITUS WITHOUT COMPLICATION, WITHOUT LONG-TERM CURRENT USE OF INSULIN (MULTI): ICD-10-CM

## 2024-11-22 PROCEDURE — 3074F SYST BP LT 130 MM HG: CPT | Performed by: FAMILY MEDICINE

## 2024-11-22 PROCEDURE — 1160F RVW MEDS BY RX/DR IN RCRD: CPT | Performed by: FAMILY MEDICINE

## 2024-11-22 PROCEDURE — G2211 COMPLEX E/M VISIT ADD ON: HCPCS | Performed by: FAMILY MEDICINE

## 2024-11-22 PROCEDURE — 1159F MED LIST DOCD IN RCRD: CPT | Performed by: FAMILY MEDICINE

## 2024-11-22 PROCEDURE — 3078F DIAST BP <80 MM HG: CPT | Performed by: FAMILY MEDICINE

## 2024-11-22 PROCEDURE — 99214 OFFICE O/P EST MOD 30 MIN: CPT | Performed by: FAMILY MEDICINE

## 2024-11-22 PROCEDURE — 1036F TOBACCO NON-USER: CPT | Performed by: FAMILY MEDICINE

## 2024-11-22 RX ORDER — AMOXICILLIN AND CLAVULANATE POTASSIUM 875; 125 MG/1; MG/1
875 TABLET, FILM COATED ORAL 2 TIMES DAILY
Qty: 20 TABLET | Refills: 0 | Status: SHIPPED | OUTPATIENT
Start: 2024-11-22 | End: 2024-12-02

## 2024-11-22 RX ORDER — ALPRAZOLAM 0.5 MG/1
0.5 TABLET ORAL 3 TIMES DAILY PRN
Qty: 90 TABLET | Refills: 1 | Status: SHIPPED | OUTPATIENT
Start: 2024-11-22

## 2024-11-22 ASSESSMENT — ANXIETY QUESTIONNAIRES
6. BECOMING EASILY ANNOYED OR IRRITABLE: NOT AT ALL
2. NOT BEING ABLE TO STOP OR CONTROL WORRYING: SEVERAL DAYS
5. BEING SO RESTLESS THAT IT IS HARD TO SIT STILL: NOT AT ALL
GAD7 TOTAL SCORE: 4
1. FEELING NERVOUS, ANXIOUS, OR ON EDGE: SEVERAL DAYS
IF YOU CHECKED OFF ANY PROBLEMS ON THIS QUESTIONNAIRE, HOW DIFFICULT HAVE THESE PROBLEMS MADE IT FOR YOU TO DO YOUR WORK, TAKE CARE OF THINGS AT HOME, OR GET ALONG WITH OTHER PEOPLE: SOMEWHAT DIFFICULT
7. FEELING AFRAID AS IF SOMETHING AWFUL MIGHT HAPPEN: NOT AT ALL
4. TROUBLE RELAXING: SEVERAL DAYS
3. WORRYING TOO MUCH ABOUT DIFFERENT THINGS: SEVERAL DAYS

## 2024-11-22 NOTE — PROGRESS NOTES
Subjective   Patient ID: Aury Kimball is a 79 y.o. female who presents for Annual Exam (Check up no blood work today ).  HPI  No SE meds  Seems like LLQ hurts when eats (only did 6 days of antibiotics for the diverticulitis)- better but pain not gone completely (cipro made her too nauseated)  No CP, SOB, palpitations, weakness, dizziness, HA, vision changes  Some tingling in feet at night     Mood is up and down- anxiety fluctuates up and down  No hopeless, worthless  No SI/HI     Ophtho- about 1 year  Dentist- 2022  [place]- 2/24  Colonoscopy- 12/15- will consider  PARTH-  FOBT-  UA/Micro- 2/24  Mammo- refused  DEXA- ordered  PAP- N/A  Lung CT-  Coronary Calcium CT Score-  AAA-  EKG-  RSV-  Prevnar- says had 2 of them  Flu- 9/24  Shingrix- 2/1/24  Td-  Hep C-  Advance Directives- has them  ETOH screen- 2/21/24  CV risk- 2/21/24  AWV- 2/21/24  MDD screen- 2/21/24  DEACON- 11/22/24    OARRS:  Pavel Jaramillo, DO on 11/22/2024  8:53 AM  I have personally reviewed the OARRS report for Aury Kimball. I have considered the risks of abuse, dependence, addiction and diversion and I believe that it is clinically appropriate for Aury Kimball to be prescribed this medication    Is the patient prescribed a combination of a benzodiazepine and opioid?  No    Last Urine Drug Screen / ordered today: No  Recent Results (from the past 8760 hours)   Benzodiazepine Confirmation, Urine    Collection Time: 02/21/24  9:14 AM   Result Value Ref Range    Clonazepam <25 <25 ng/mL    7-Aminoclonazepam <25 <25 ng/mL    Alprazolam 121 (H) <25 ng/mL    Alpha-Hydroxyalprazolam 225 (H) <25 ng/mL    Midazolam <25 <25 ng/mL    Alpha-Hydroxymidazolam <25 <25 ng/mL    Chlordiazepoxide <25 <25 ng/mL    Diazepam <25 <25 ng/mL    Nordiazepam <25 <25 ng/mL    Temazepam <25 <25 ng/mL    Oxazepam <25 <25 ng/mL    Lorazepam <25 <25 ng/mL   Drug Screen, Urine With Reflex to Confirmation    Collection Time: 02/21/24  9:14 AM   Result Value Ref Range    Amphetamine  Screen, Urine Presumptive Negative Presumptive Negative    Barbiturate Screen, Urine Presumptive Negative Presumptive Negative    Benzodiazepines Screen, Urine Presumptive Positive (A) Presumptive Negative    Cannabinoid Screen, Urine Presumptive Negative Presumptive Negative    Cocaine Metabolite Screen, Urine Presumptive Negative Presumptive Negative    Fentanyl Screen, Urine Presumptive Negative Presumptive Negative    Opiate Screen, Urine Presumptive Negative Presumptive Negative    Oxycodone Screen, Urine Presumptive Negative Presumptive Negative    PCP Screen, Urine Presumptive Negative Presumptive Negative     Results are as expected.         Controlled Substance Agreement:  Date of the Last Agreement: 24  Reviewed Controlled Substance Agreement including but not limited to the benefits, risks, and alternatives to treatment with a Controlled Substance medication(s).    Benzodiazepines:  What is the patient's goal of therapy? Able to control anxiety  Is this being achieved with current treatment? Angie    REDD-7:  Over the last 2 weeks, how often have you been bothered by any of the following problems?  Feeling nervous, anxious, or on edge: 1  Not being able to stop or control worryin  Worrying too much about different things: 1  Trouble relaxin  Being so restless that it is hard to sit still: 0  Becoming easily annoyed or irritable: 0  Feeling afraid as if something awful might happen: 0  REDD-7 Total Score: 4        Activities of Daily Living:   Is your overall impression that this patient is benefiting (symptom reduction outweighs side effects) from benzodiazepine therapy? Yes     1. Physical Functioning: Same  2. Family Relationship: Better  3. Social Relationship: Better  4. Mood: Better  5. Sleep Patterns: Better  6. Overall Function: Better      Current Outpatient Medications:     Accu-Chek Fastclix Lancet Drum misc, TEST BLOOD SUGAR TWO TIMES DAILY, Disp: 204 each, Rfl: 3    alcohol swabs  pads, medicated, Apply 1 Swab topically in the morning and 1 Swab before bedtime. Use to check blood glucose twice daily.., Disp: 200 each, Rfl: 3    allopurinol (Zyloprim) 100 mg tablet, Take 1 tablet (100 mg) by mouth., Disp: , Rfl:     betamethasone dipropionate 0.05 % cream, Apply topically 2 times a day., Disp: , Rfl:     blood sugar diagnostic (Accu-Chek Guide test strips) strip, TEST TWO TIMES DAILY, Disp: 200 strip, Rfl: 3    blood-glucose meter (Accu-Chek Guide Glucose Meter) misc, USE AS DIRECTED, Disp: 1 each, Rfl: 3    cholecalciferol (Vitamin D-3) 50,000 unit capsule, Take 1 capsule (50,000 Units) by mouth 1 (one) time per week., Disp: 12 capsule, Rfl: 3    colchicine 0.6 mg tablet, Take 1 tablet (0.6 mg) by mouth once daily., Disp: , Rfl:     FLUoxetine (PROzac) 20 mg tablet, Take 1 tablet (20 mg) by mouth once daily., Disp: 90 tablet, Rfl: 3    fluticasone (Flonase) 50 mcg/actuation nasal spray, Administer 1 spray into each nostril once daily. Shake gently. Before first use, prime pump. After use, clean tip and replace cap., Disp: 16 g, Rfl: 11    ketotifen (Zaditor) 0.025 % (0.035 %) ophthalmic solution, Administer 1 drop into both eyes 2 times a day., Disp: 5 mL, Rfl: 0    lansoprazole (Prevacid) 30 mg DR capsule, Take 1 capsule (30 mg) by mouth once daily in the morning. Take before meals., Disp: , Rfl:     metFORMIN (Glucophage) 500 mg tablet, TAKE 1 TABLET TWICE DAILY WITH MEALS, Disp: 180 tablet, Rfl: 3    mirtazapine (Remeron) 15 mg tablet, TAKE 1/2 TABLET ONE TIME DAILY AT BEDTIME, Disp: 45 tablet, Rfl: 3    omeprazole (PriLOSEC) 20 mg DR capsule, TAKE 1 CAPSULE EVERY DAY BEFORE A MEAL, Disp: 90 capsule, Rfl: 3    rosuvastatin (Crestor) 20 mg tablet, TAKE 1 TABLET EVERY DAY, Disp: 90 tablet, Rfl: 3    valsartan-hydrochlorothiazide (Diovan-HCT) 80-12.5 mg tablet, TAKE 1 TABLET EVERY DAY, Disp: 90 tablet, Rfl: 3    vit C-zinc citrate-elderberry 30-1.1-25 mg tablet,chewable, Chew 1 tablet once  daily., Disp: , Rfl:     ALPRAZolam (Xanax) 0.5 mg tablet, Take 1 tablet (0.5 mg) by mouth 3 times a day as needed for anxiety., Disp: 90 tablet, Rfl: 1    amoxicillin-pot clavulanate (Augmentin) 875-125 mg tablet, Take 1 tablet (875 mg) by mouth 2 times a day for 10 days., Disp: 20 tablet, Rfl: 0   Past Surgical History:   Procedure Laterality Date    APPENDECTOMY  07/05/2013    Appendectomy    CARDIAC CATHETERIZATION  06/07/2016    Cardiac Cath Procedure Summary    ELBOW SURGERY  07/05/2013    Elbow Surgery    GALLBLADDER SURGERY  07/05/2013    Gallbladder Surgery    NECK SURGERY  07/05/2013    Neck Surgery    OTHER SURGICAL HISTORY  07/05/2013    Hysterotomy (Obstetrical)    OTHER SURGICAL HISTORY  01/08/2019    Complete colonoscopy    SHOULDER SURGERY  07/05/2013    Shoulder Surgery Right      Past Medical History:   Diagnosis Date    Abdominal distension (gaseous) 10/02/2017    Bloating    Acute maxillary sinusitis, unspecified 12/21/2018    Acute non-recurrent maxillary sinusitis    Body mass index (BMI) 26.0-26.9, adult 01/29/2021    Body mass index (BMI) of 26.0 to 26.9 in adult    Body mass index (BMI) 28.0-28.9, adult 06/10/2020    Body mass index (BMI) of 28.0 to 28.9 in adult    Body mass index (BMI) 29.0-29.9, adult 03/09/2019    BMI 29.0-29.9,adult    COVID-19 virus infection 02/18/2023    Diverticulosis of intestine, part unspecified, without perforation or abscess without bleeding 07/01/2019    Diverticulosis    Encounter for screening for diabetes mellitus 11/14/2016    Encounter for screening for diabetes mellitus    Fatty (change of) liver, not elsewhere classified 01/15/2019    Nonalcoholic fatty liver disease    Left lower quadrant pain 07/01/2019    Abdominal pain, LLQ (left lower quadrant)    Other gastritis without bleeding 05/08/2017    Bile reflux gastritis    Other intervertebral disc displacement, thoracic region 07/05/2013    Thoracic disc herniation    Pain in unspecified foot  2018    Pains, foot    Personal history of other (healed) physical injury and trauma 2017    History of traumatic injury of head    Personal history of other diseases of the respiratory system 10/10/2018    History of allergic rhinitis    Personal history of other diseases of the respiratory system 2020    History of bronchitis    Personal history of other diseases of the respiratory system 2016    History of laryngitis    Personal history of other endocrine, nutritional and metabolic disease 10/10/2018    History of hyperglycemia    Personal history of other endocrine, nutritional and metabolic disease 2018    History of hyperglycemia    Personal history of other infectious and parasitic diseases 2020    History of candidiasis of vagina    Personal history of other mental and behavioral disorders 2019    History of depression    Personal history of other mental and behavioral disorders 2013    History of anxiety disorder    Personal history of other specified conditions 2016    History of abdominal pain    Prediabetes 04/15/2018    Pre-diabetes    Spondylosis without myelopathy or radiculopathy, cervical region     Cervical spondylosis    Spondylosis without myelopathy or radiculopathy, lumbar region 2013    Lumbar spondylosis     Social History     Tobacco Use    Smoking status: Former     Current packs/day: 0.00     Types: Cigarettes     Quit date:      Years since quittin.9    Smokeless tobacco: Former   Vaping Use    Vaping status: Never Used   Substance Use Topics    Alcohol use: Not Currently    Drug use: Never      Family History   Problem Relation Name Age of Onset    Severe combined immunodeficiency Mother      Alzheimer's disease Father      Breast cancer Sister      Colon cancer Sister      Other (Malignant Neoplasm of urinar bladder) Sister      Colon cancer Brother      Skin cancer Brother        Review of Systems    Objective   BP  "126/72   Pulse 72   Ht 1.651 m (5' 5\")   Wt 71.7 kg (158 lb)   SpO2 98%   BMI 26.29 kg/m²    Physical Exam  Vitals and nursing note reviewed.   Constitutional:       General: She is not in acute distress.     Appearance: Normal appearance.   HENT:      Head: Normocephalic and atraumatic.      Right Ear: Tympanic membrane, ear canal and external ear normal.      Left Ear: Tympanic membrane, ear canal and external ear normal.      Nose: Nose normal.      Mouth/Throat:      Mouth: Mucous membranes are moist.      Pharynx: Oropharynx is clear.   Eyes:      Extraocular Movements: Extraocular movements intact.      Conjunctiva/sclera: Conjunctivae normal.      Pupils: Pupils are equal, round, and reactive to light.   Neck:      Vascular: No carotid bruit.   Cardiovascular:      Rate and Rhythm: Normal rate and regular rhythm.      Pulses: Normal pulses.      Heart sounds: Normal heart sounds. No murmur heard.  Pulmonary:      Effort: Pulmonary effort is normal.      Breath sounds: Normal breath sounds. No wheezing, rhonchi or rales.   Abdominal:      General: Abdomen is flat. Bowel sounds are normal.      Palpations: Abdomen is soft. There is no mass.      Tenderness: There is abdominal tenderness in the left lower quadrant.   Musculoskeletal:         General: Normal range of motion.      Cervical back: Normal range of motion and neck supple.   Lymphadenopathy:      Cervical: No cervical adenopathy.   Skin:     Capillary Refill: Capillary refill takes less than 2 seconds.   Neurological:      General: No focal deficit present.      Mental Status: She is alert and oriented to person, place, and time.      Cranial Nerves: No cranial nerve deficit.      Motor: No weakness.      Deep Tendon Reflexes: Reflexes normal.   Psychiatric:         Mood and Affect: Mood normal.         Behavior: Behavior normal.         Assessment/Plan   Problem List Items Addressed This Visit       REDD (generalized anxiety disorder)    Relevant " Medications    ALPRAZolam (Xanax) 0.5 mg tablet    Familial hypercholesteremia    Diabetes mellitus, type II (Multi)    Benign essential hypertension     Other Visit Diagnoses       Diverticulitis    -  Primary    Relevant Medications    amoxicillin-pot clavulanate (Augmentin) 875-125 mg tablet    LLQ abdominal pain        Relevant Orders    CT chest abdomen pelvis with IV and oral contrast    Menopause        Relevant Orders    XR DEXA bone density        Renewed/continued rest of medications    Updated Health Maintenance in HPI section  HTN, controlled- continue meds, low sodium diet     Overweight- caloric restriction, increase CV exercise     Hyperlipidemia- continue meds, low fat/cholesterol diet     GERD- omeprazole, limit triggers     DM, type 2- avoid sugars, low carbs, increase CV exercise, continue meds, check feet daily     MDD/REDD- Fluoxetine, xanax     Vitamin D- supplement, follow level    LLQ Abd Pain- CT A/P if no better with augmentin for diverticulitis    Patient understands and agrees with treatment plan    Pavel Jaramillo, DO

## 2024-12-10 DIAGNOSIS — E11.9 TYPE 2 DIABETES MELLITUS WITHOUT COMPLICATION, WITHOUT LONG-TERM CURRENT USE OF INSULIN (MULTI): ICD-10-CM

## 2024-12-10 RX ORDER — BLOOD SUGAR DIAGNOSTIC
STRIP MISCELLANEOUS
Qty: 200 STRIP | Refills: 3 | Status: SHIPPED | OUTPATIENT
Start: 2024-12-10

## 2024-12-11 ENCOUNTER — APPOINTMENT (OUTPATIENT)
Dept: RADIOLOGY | Facility: HOSPITAL | Age: 79
End: 2024-12-11
Payer: MEDICARE

## 2025-01-04 DIAGNOSIS — E78.01 FAMILIAL HYPERCHOLESTEREMIA: ICD-10-CM

## 2025-01-06 RX ORDER — ROSUVASTATIN CALCIUM 20 MG/1
20 TABLET, COATED ORAL DAILY
Qty: 90 TABLET | Refills: 3 | Status: SHIPPED | OUTPATIENT
Start: 2025-01-06

## 2025-02-05 DIAGNOSIS — K21.00 GASTROESOPHAGEAL REFLUX DISEASE WITH ESOPHAGITIS WITHOUT HEMORRHAGE: ICD-10-CM

## 2025-02-05 RX ORDER — OMEPRAZOLE 20 MG/1
20 CAPSULE, DELAYED RELEASE ORAL
Qty: 90 CAPSULE | Refills: 3 | Status: SHIPPED | OUTPATIENT
Start: 2025-02-05

## 2025-02-14 DIAGNOSIS — E11.9 TYPE 2 DIABETES MELLITUS WITHOUT COMPLICATION, WITHOUT LONG-TERM CURRENT USE OF INSULIN (MULTI): ICD-10-CM

## 2025-02-14 RX ORDER — BLOOD SUGAR DIAGNOSTIC
STRIP MISCELLANEOUS
Qty: 200 STRIP | Refills: 3 | Status: SHIPPED | OUTPATIENT
Start: 2025-02-14

## 2025-02-25 ENCOUNTER — APPOINTMENT (OUTPATIENT)
Dept: PRIMARY CARE | Facility: CLINIC | Age: 80
End: 2025-02-25
Payer: MEDICARE

## 2025-02-25 VITALS
BODY MASS INDEX: 26.99 KG/M2 | HEIGHT: 65 IN | SYSTOLIC BLOOD PRESSURE: 126 MMHG | HEART RATE: 82 BPM | OXYGEN SATURATION: 98 % | DIASTOLIC BLOOD PRESSURE: 72 MMHG | WEIGHT: 162 LBS

## 2025-02-25 DIAGNOSIS — Z00.00 ROUTINE GENERAL MEDICAL EXAMINATION AT HEALTH CARE FACILITY: Primary | ICD-10-CM

## 2025-02-25 DIAGNOSIS — Z79.899 BENZODIAZEPINE CONTRACT EXISTS: ICD-10-CM

## 2025-02-25 DIAGNOSIS — K21.00 GASTROESOPHAGEAL REFLUX DISEASE WITH ESOPHAGITIS WITHOUT HEMORRHAGE: ICD-10-CM

## 2025-02-25 DIAGNOSIS — E11.9 TYPE 2 DIABETES MELLITUS WITHOUT COMPLICATION, WITHOUT LONG-TERM CURRENT USE OF INSULIN (MULTI): ICD-10-CM

## 2025-02-25 DIAGNOSIS — Z71.89 CARDIAC RISK COUNSELING: ICD-10-CM

## 2025-02-25 DIAGNOSIS — J45.990 ASTHMA, EXERCISE INDUCED (HHS-HCC): ICD-10-CM

## 2025-02-25 DIAGNOSIS — E11.42 TYPE 2 DIABETES MELLITUS WITH DIABETIC POLYNEUROPATHY, WITHOUT LONG-TERM CURRENT USE OF INSULIN: ICD-10-CM

## 2025-02-25 DIAGNOSIS — E55.9 VITAMIN D DEFICIENCY: ICD-10-CM

## 2025-02-25 DIAGNOSIS — I10 BENIGN ESSENTIAL HYPERTENSION: ICD-10-CM

## 2025-02-25 DIAGNOSIS — F33.9 DEPRESSION, RECURRENT (CMS-HCC): ICD-10-CM

## 2025-02-25 DIAGNOSIS — F41.1 GAD (GENERALIZED ANXIETY DISORDER): ICD-10-CM

## 2025-02-25 DIAGNOSIS — E78.01 FAMILIAL HYPERCHOLESTEREMIA: ICD-10-CM

## 2025-02-25 LAB
POC APPEARANCE, URINE: CLEAR
POC BILIRUBIN, URINE: NEGATIVE
POC BLOOD, URINE: NEGATIVE
POC COLOR, URINE: YELLOW
POC GLUCOSE, URINE: NEGATIVE MG/DL
POC HEMOGLOBIN A1C: 6.3 % (ref 4.2–6.5)
POC KETONES, URINE: NEGATIVE MG/DL
POC LEUKOCYTES, URINE: ABNORMAL
POC NITRITE,URINE: NEGATIVE
POC PH, URINE: 6 PH
POC PROTEIN, URINE: NEGATIVE MG/DL
POC SPECIFIC GRAVITY, URINE: 1.02
POC UROBILINOGEN, URINE: 0.2 EU/DL

## 2025-02-25 PROCEDURE — 3078F DIAST BP <80 MM HG: CPT | Performed by: FAMILY MEDICINE

## 2025-02-25 PROCEDURE — G2211 COMPLEX E/M VISIT ADD ON: HCPCS | Performed by: FAMILY MEDICINE

## 2025-02-25 PROCEDURE — G0446 INTENS BEHAVE THER CARDIO DX: HCPCS | Performed by: FAMILY MEDICINE

## 2025-02-25 PROCEDURE — G0439 PPPS, SUBSEQ VISIT: HCPCS | Performed by: FAMILY MEDICINE

## 2025-02-25 PROCEDURE — 1036F TOBACCO NON-USER: CPT | Performed by: FAMILY MEDICINE

## 2025-02-25 PROCEDURE — 99397 PER PM REEVAL EST PAT 65+ YR: CPT | Performed by: FAMILY MEDICINE

## 2025-02-25 PROCEDURE — 99214 OFFICE O/P EST MOD 30 MIN: CPT | Performed by: FAMILY MEDICINE

## 2025-02-25 PROCEDURE — 1159F MED LIST DOCD IN RCRD: CPT | Performed by: FAMILY MEDICINE

## 2025-02-25 PROCEDURE — 83036 HEMOGLOBIN GLYCOSYLATED A1C: CPT | Performed by: FAMILY MEDICINE

## 2025-02-25 PROCEDURE — 1170F FXNL STATUS ASSESSED: CPT | Performed by: FAMILY MEDICINE

## 2025-02-25 PROCEDURE — 1160F RVW MEDS BY RX/DR IN RCRD: CPT | Performed by: FAMILY MEDICINE

## 2025-02-25 PROCEDURE — 3074F SYST BP LT 130 MM HG: CPT | Performed by: FAMILY MEDICINE

## 2025-02-25 PROCEDURE — 81003 URINALYSIS AUTO W/O SCOPE: CPT | Performed by: FAMILY MEDICINE

## 2025-02-25 RX ORDER — MIRTAZAPINE 15 MG/1
TABLET, FILM COATED ORAL
Qty: 45 TABLET | Refills: 3 | Status: SHIPPED | OUTPATIENT
Start: 2025-02-25

## 2025-02-25 RX ORDER — VALSARTAN AND HYDROCHLOROTHIAZIDE 80; 12.5 MG/1; MG/1
1 TABLET, FILM COATED ORAL DAILY
Qty: 90 TABLET | Refills: 3 | Status: SHIPPED | OUTPATIENT
Start: 2025-02-25

## 2025-02-25 RX ORDER — OMEPRAZOLE 20 MG/1
20 CAPSULE, DELAYED RELEASE ORAL
Qty: 90 CAPSULE | Refills: 3 | Status: SHIPPED | OUTPATIENT
Start: 2025-02-25

## 2025-02-25 RX ORDER — ASPIRIN 325 MG
50000 TABLET, DELAYED RELEASE (ENTERIC COATED) ORAL
Qty: 12 CAPSULE | Refills: 3 | Status: SHIPPED | OUTPATIENT
Start: 2025-02-25

## 2025-02-25 RX ORDER — ROSUVASTATIN CALCIUM 20 MG/1
20 TABLET, COATED ORAL DAILY
Qty: 90 TABLET | Refills: 3 | Status: SHIPPED | OUTPATIENT
Start: 2025-02-25

## 2025-02-25 RX ORDER — METFORMIN HYDROCHLORIDE 500 MG/1
500 TABLET ORAL
Qty: 180 TABLET | Refills: 3 | Status: SHIPPED | OUTPATIENT
Start: 2025-02-25

## 2025-02-25 RX ORDER — ALPRAZOLAM 0.5 MG/1
0.5 TABLET ORAL 3 TIMES DAILY PRN
Qty: 90 TABLET | Refills: 1 | Status: SHIPPED | OUTPATIENT
Start: 2025-02-25

## 2025-02-25 ASSESSMENT — ENCOUNTER SYMPTOMS
ADENOPATHY: 0
POLYPHAGIA: 0
LOSS OF SENSATION IN FEET: 0
ABDOMINAL PAIN: 0
NERVOUS/ANXIOUS: 1
BACK PAIN: 0
TROUBLE SWALLOWING: 0
ARTHRALGIAS: 0
DEPRESSION: 0
WEAKNESS: 0
EYE PAIN: 0
FEVER: 0
DYSURIA: 0
TREMORS: 0
DIARRHEA: 0
CHILLS: 0
SORE THROAT: 0
EYE REDNESS: 0
COUGH: 0
PALPITATIONS: 0
DYSPHORIC MOOD: 1
CHEST TIGHTNESS: 0
CONSTIPATION: 0
OCCASIONAL FEELINGS OF UNSTEADINESS: 0
POLYDIPSIA: 0
NUMBNESS: 1
HEADACHES: 0
BRUISES/BLEEDS EASILY: 0
NAUSEA: 0
SHORTNESS OF BREATH: 0
WHEEZING: 0
HEMATURIA: 0
DIZZINESS: 0
FATIGUE: 0
VOMITING: 0
FREQUENCY: 0
BLOOD IN STOOL: 0
COLOR CHANGE: 0

## 2025-02-25 ASSESSMENT — ACTIVITIES OF DAILY LIVING (ADL)
TAKING_MEDICATION: INDEPENDENT
BATHING: INDEPENDENT
GROCERY_SHOPPING: INDEPENDENT
DRESSING: INDEPENDENT
MANAGING_FINANCES: INDEPENDENT
DOING_HOUSEWORK: INDEPENDENT

## 2025-02-25 ASSESSMENT — ANXIETY QUESTIONNAIRES
4. TROUBLE RELAXING: NOT AT ALL
3. WORRYING TOO MUCH ABOUT DIFFERENT THINGS: NOT AT ALL
6. BECOMING EASILY ANNOYED OR IRRITABLE: NOT AT ALL
GAD7 TOTAL SCORE: 2
IF YOU CHECKED OFF ANY PROBLEMS ON THIS QUESTIONNAIRE, HOW DIFFICULT HAVE THESE PROBLEMS MADE IT FOR YOU TO DO YOUR WORK, TAKE CARE OF THINGS AT HOME, OR GET ALONG WITH OTHER PEOPLE: NOT DIFFICULT AT ALL
5. BEING SO RESTLESS THAT IT IS HARD TO SIT STILL: NOT AT ALL
7. FEELING AFRAID AS IF SOMETHING AWFUL MIGHT HAPPEN: NOT AT ALL
2. NOT BEING ABLE TO STOP OR CONTROL WORRYING: SEVERAL DAYS
1. FEELING NERVOUS, ANXIOUS, OR ON EDGE: SEVERAL DAYS

## 2025-02-25 NOTE — PROGRESS NOTES
Subjective   Reason for Visit: Aury Kimball is an 79 y.o. female here for a Medicare Wellness visit.     Past Medical, Surgical, and Family History reviewed and updated in chart.    Reviewed all medications by prescribing practitioner or clinical pharmacist (such as prescriptions, OTCs, herbal therapies and supplements) and documented in the medical record.    HPI  No SE meds  No CP, SOB, palpitations, weakness, dizziness, HA, vision changes  Some tingling in feet at night     Mood is up and down- anxiety fluctuates up and down  No hopeless, worthless  No SI/HI     Ophtho- about 1 year  Dentist- 2/25  [place]- 2/25  Colonoscopy- 12/15- will consider  PARTH-  FOBT-  UA/Micro- 2/24  Mammo- refused  DEXA- refused  PAP- N/A  Lung CT-  Coronary Calcium CT Score-  AAA-  EKG-  RSV-  Prevnar-  Flu- 9/24  Shingrix- 2/1/24  Td-  Hep C-  Advance Directives- has them  ETOH screen- 2/25/25  CV risk- 2/25/25  AWV- 2/25/25  MDD screen- 2/25/25  DEACON      OARRS:  Pavel Jaramillo, DO on 2/25/2025  8:31 AM  I have personally reviewed the OARRS report for Aury Kimball. I have considered the risks of abuse, dependence, addiction and diversion and I believe that it is clinically appropriate for Aury Kimball to be prescribed this medication    Is the patient prescribed a combination of a benzodiazepine and opioid?  No    Last Urine Drug Screen / ordered today: Yes  No results found for this or any previous visit (from the past 8760 hours).  Results are as expected.       Controlled Substance Agreement:  Date of the Last Agreement: 8/23/24  Reviewed Controlled Substance Agreement including but not limited to the benefits, risks, and alternatives to treatment with a Controlled Substance medication(s).    Benzodiazepines:  What is the patient's goal of therapy? Able to relax  Is this being achieved with current treatment? Yes    REDD-7:  Over the last 2 weeks, how often have you been bothered by any of the following problems?  Feeling nervous,  anxious, or on edge: 1  Not being able to stop or control worryin  Worrying too much about different things: 0  Trouble relaxin  Being so restless that it is hard to sit still: 0  Becoming easily annoyed or irritable: 0  Feeling afraid as if something awful might happen: 0  REDD-7 Total Score: 2        Activities of Daily Living:   Is your overall impression that this patient is benefiting (symptom reduction outweighs side effects) from benzodiazepine therapy? Yes     1. Physical Functioning: Same  2. Family Relationship: Better  3. Social Relationship: Better  4. Mood: Better  5. Sleep Patterns: Better  6. Overall Function: Better    Patient Care Team:  Pavel Jaramillo,  as PCP - General (Family Medicine)  Petar Rodriguez DO as PCP - [employer] PCP  Pavel Thompson,  as Referring Physician (Orthopaedic Surgery)     Review of Systems   Constitutional:  Negative for chills, fatigue and fever.   HENT:  Negative for congestion, ear discharge, ear pain, hearing loss, nosebleeds, sore throat, tinnitus and trouble swallowing.    Eyes:  Negative for pain, redness and visual disturbance.   Respiratory:  Negative for cough, chest tightness, shortness of breath and wheezing.    Cardiovascular:  Negative for chest pain, palpitations and leg swelling.   Gastrointestinal:  Negative for abdominal pain, blood in stool, constipation, diarrhea, nausea and vomiting.   Endocrine: Negative for cold intolerance, heat intolerance, polydipsia, polyphagia and polyuria.   Genitourinary:  Negative for dysuria, frequency, hematuria and urgency.   Musculoskeletal:  Negative for arthralgias, back pain and gait problem.   Skin:  Negative for color change and rash.   Neurological:  Positive for numbness. Negative for dizziness, tremors, syncope, weakness and headaches.   Hematological:  Negative for adenopathy. Does not bruise/bleed easily.   Psychiatric/Behavioral:  Positive for dysphoric mood. The patient is  "nervous/anxious.        Objective   Vitals:  /72   Pulse 82   Ht 1.651 m (5' 5\")   Wt 73.5 kg (162 lb)   SpO2 98%   BMI 26.96 kg/m²       Physical Exam  Vitals and nursing note reviewed.   Constitutional:       General: She is not in acute distress.     Appearance: Normal appearance.   HENT:      Head: Normocephalic and atraumatic.      Right Ear: Tympanic membrane, ear canal and external ear normal.      Left Ear: Tympanic membrane, ear canal and external ear normal.      Nose: Nose normal.      Mouth/Throat:      Mouth: Mucous membranes are moist.      Pharynx: Oropharynx is clear.   Eyes:      Extraocular Movements: Extraocular movements intact.      Conjunctiva/sclera: Conjunctivae normal.      Pupils: Pupils are equal, round, and reactive to light.   Neck:      Vascular: No carotid bruit.   Cardiovascular:      Rate and Rhythm: Normal rate and regular rhythm.      Pulses: Normal pulses.           Dorsalis pedis pulses are 2+ on the right side and 2+ on the left side.        Posterior tibial pulses are 2+ on the right side and 2+ on the left side.      Heart sounds: Normal heart sounds. No murmur heard.  Pulmonary:      Effort: Pulmonary effort is normal.      Breath sounds: Normal breath sounds. No wheezing, rhonchi or rales.   Abdominal:      General: Abdomen is flat. Bowel sounds are normal.      Palpations: Abdomen is soft. There is no mass.   Musculoskeletal:         General: Normal range of motion.      Cervical back: Normal range of motion and neck supple.      Right foot: Normal range of motion. No deformity.      Left foot: Normal range of motion. No deformity.   Feet:      Right foot:      Protective Sensation: 7 sites tested.  4 sites sensed.      Skin integrity: Skin integrity normal.      Toenail Condition: Right toenails are normal.      Left foot:      Protective Sensation: 7 sites tested.  4 sites sensed.      Skin integrity: Skin integrity normal.      Toenail Condition: Left toenails " are normal.   Lymphadenopathy:      Cervical: No cervical adenopathy.   Skin:     Capillary Refill: Capillary refill takes less than 2 seconds.   Neurological:      General: No focal deficit present.      Mental Status: She is alert and oriented to person, place, and time.      Cranial Nerves: No cranial nerve deficit.      Motor: No weakness.      Deep Tendon Reflexes: Reflexes normal.   Psychiatric:         Mood and Affect: Mood normal.         Behavior: Behavior normal.         Assessment & Plan  Routine general medical examination at health care facility         Type 2 diabetes mellitus with diabetic polyneuropathy, without long-term current use of insulin         Type 2 diabetes mellitus without complication, without long-term current use of insulin (Multi)    Orders:    POCT glycosylated hemoglobin (Hb A1C) manually resulted    Comprehensive Metabolic Panel    POCT UA Automated manually resulted    Protein, Urine Random    metFORMIN (Glucophage) 500 mg tablet; Take 1 tablet (500 mg) by mouth 2 times daily (morning and late afternoon).    Depression, recurrent (CMS-HCC)         Asthma, exercise induced (HHS-HCC)         Cardiac risk counseling         Benign essential hypertension    Orders:    Comprehensive Metabolic Panel    valsartan-hydrochlorothiazide (Diovan-HCT) 80-12.5 mg tablet; Take 1 tablet by mouth once daily.    Familial hypercholesteremia    Orders:    Lipid Panel    Comprehensive Metabolic Panel    rosuvastatin (Crestor) 20 mg tablet; Take 1 tablet (20 mg) by mouth once daily.    REDD (generalized anxiety disorder)    Orders:    ALPRAZolam (Xanax) 0.5 mg tablet; Take 1 tablet (0.5 mg) by mouth 3 times a day as needed for anxiety.    mirtazapine (Remeron) 15 mg tablet; TAKE 1/2 TABLET ONE TIME DAILY AT BEDTIME    Gastroesophageal reflux disease with esophagitis without hemorrhage    Orders:    omeprazole (PriLOSEC) 20 mg DR capsule; Take 1 capsule (20 mg) by mouth once daily in the morning. Take  before meals.    Vitamin D deficiency    Orders:    cholecalciferol (Vitamin D-3) 50,000 unit capsule; Take 1 capsule (50,000 Units) by mouth 1 (one) time per week.    Benzodiazepine contract exists    Orders:    Drug Screen, Urine With Reflex to Confirmation       Renewed/continued rest of medications  Checked  labs  Updated Health Maintenance in HPI section  HTN, controlled- continue meds, low sodium diet     Overweight- caloric restriction, increase CV exercise     Hyperlipidemia- continue meds, low fat/cholesterol diet     GERD- omeprazole, limit triggers     DM, type 2- avoid sugars, low carbs, increase CV exercise, continue meds, check feet daily     MDD/REDD- Fluoxetine, xanax     Vitamin D- supplement, follow level    Cardiac Risk Assessment  5 minutes were spent discussing Cardiovascular risk (46.3%) and, if needed, lifestyle modifications were recommended, including nutritional choices, exercise, and elimination of habits contributing to risk.   Aspirin use/disuse was discussed following the guidelines below:  low dose ASA ( mg) should be considered:    If prior Heart Attack/Stroke/Peripheral vascular disease:  Generally recommend daily low dose aspirin unless extremely high bleeding risk (e.g., gastrointestinal).    If no prior Heart Attack/Stroke/Peripheral vascular disease:              Age over 70: Do not use Aspirin for prevention    Age less than 70 and 10-year cardiovascular disease risk is >20%: use low dose Aspirin for prevention.

## 2025-02-25 NOTE — ASSESSMENT & PLAN NOTE
Orders:    ALPRAZolam (Xanax) 0.5 mg tablet; Take 1 tablet (0.5 mg) by mouth 3 times a day as needed for anxiety.    mirtazapine (Remeron) 15 mg tablet; TAKE 1/2 TABLET ONE TIME DAILY AT BEDTIME

## 2025-02-25 NOTE — PATIENT INSTRUCTIONS
10-15 mm Hg pressure on compression socks    It is important to wear your sun block when you are going to spend more then a minute or two in the sun to reduce your risk of skin cancer    If you are a woman, then you should be doing a self breast exam once a month to feel for any lumps or bumps that do not resolve during the month. Call if you find this.    If you are a man, then you should be doing a self testicular exam once a month to feel for any lumps or bumps. Call if you find this.    You should get on average 150 minutes of cardiovascular exercise (such as brisk walking, running, biking, swimming, etc.) a week.  You should also limit food high in sodium, sugar and saturated/trans fats.  Eating lots of fruits and vegetables is good at helping lower cholesterol and blood pressure if prepared correctly    The age for colonoscopy is age 45-75 for average risk individuals    Prostate screen starts at age 50 and breast cancer screening is at age 40    Woman should get a pap smear between the ages of 21 and 65. The frequency depends on your age, the type of pap you had last and the result of the last pap.    Alcohol should be limited to 1 a day for women and 2 a day for men.    No amount of tobacco in any form is safe. It is recommended that no tobacco be used in any form.  Vapes are also not safe as there are multiple harmful chemicals in them and the heat can directly damage lung tissue.

## 2025-02-25 NOTE — ASSESSMENT & PLAN NOTE
Orders:    omeprazole (PriLOSEC) 20 mg DR capsule; Take 1 capsule (20 mg) by mouth once daily in the morning. Take before meals.

## 2025-02-25 NOTE — ASSESSMENT & PLAN NOTE
Orders:    cholecalciferol (Vitamin D-3) 50,000 unit capsule; Take 1 capsule (50,000 Units) by mouth 1 (one) time per week.

## 2025-02-25 NOTE — ASSESSMENT & PLAN NOTE
Orders:    Lipid Panel    Comprehensive Metabolic Panel    rosuvastatin (Crestor) 20 mg tablet; Take 1 tablet (20 mg) by mouth once daily.

## 2025-02-25 NOTE — ASSESSMENT & PLAN NOTE
Orders:    Comprehensive Metabolic Panel    valsartan-hydrochlorothiazide (Diovan-HCT) 80-12.5 mg tablet; Take 1 tablet by mouth once daily.

## 2025-02-25 NOTE — ASSESSMENT & PLAN NOTE
Orders:    POCT glycosylated hemoglobin (Hb A1C) manually resulted    Comprehensive Metabolic Panel    POCT UA Automated manually resulted    Protein, Urine Random    metFORMIN (Glucophage) 500 mg tablet; Take 1 tablet (500 mg) by mouth 2 times daily (morning and late afternoon).

## 2025-02-26 LAB
ALBUMIN SERPL-MCNC: 4.8 G/DL (ref 3.6–5.1)
ALP SERPL-CCNC: 99 U/L (ref 37–153)
ALT SERPL-CCNC: 19 U/L (ref 6–29)
ANION GAP SERPL CALCULATED.4IONS-SCNC: 13 MMOL/L (CALC) (ref 7–17)
AST SERPL-CCNC: 22 U/L (ref 10–35)
BILIRUB SERPL-MCNC: 0.6 MG/DL (ref 0.2–1.2)
BUN SERPL-MCNC: 15 MG/DL (ref 7–25)
CALCIUM SERPL-MCNC: 10.3 MG/DL (ref 8.6–10.4)
CHLORIDE SERPL-SCNC: 103 MMOL/L (ref 98–110)
CHOLEST SERPL-MCNC: 217 MG/DL
CHOLEST/HDLC SERPL: 3.3 (CALC)
CO2 SERPL-SCNC: 27 MMOL/L (ref 20–32)
CREAT SERPL-MCNC: 0.82 MG/DL (ref 0.6–1)
EGFRCR SERPLBLD CKD-EPI 2021: 73 ML/MIN/1.73M2
GLUCOSE SERPL-MCNC: 111 MG/DL (ref 65–99)
HDLC SERPL-MCNC: 65 MG/DL
LDLC SERPL CALC-MCNC: 118 MG/DL (CALC)
NONHDLC SERPL-MCNC: 152 MG/DL (CALC)
POTASSIUM SERPL-SCNC: 4.7 MMOL/L (ref 3.5–5.3)
PROT SERPL-MCNC: 7.2 G/DL (ref 6.1–8.1)
SODIUM SERPL-SCNC: 143 MMOL/L (ref 135–146)
TRIGL SERPL-MCNC: 226 MG/DL

## 2025-02-28 LAB
1OH-MIDAZOLAM UR-MCNC: NEGATIVE NG/ML
7AMINOCLONAZEPAM UR-MCNC: NEGATIVE NG/ML
A-OH ALPRAZ UR-MCNC: 219 NG/ML
A-OH-TRIAZOLAM UR-MCNC: NEGATIVE NG/ML
AMPHETAMINES UR QL: NEGATIVE NG/ML
BARBITURATES UR QL: NEGATIVE NG/ML
BENZODIAZ UR QL: POSITIVE NG/ML
BZE UR QL: NEGATIVE NG/ML
CHOLEST SERPL-MCNC: NORMAL MG/DL
CHOLEST/HDLC SERPL: NORMAL (CALC)
CREAT UR-MCNC: 101 MG/DL (ref 20–275)
CREAT UR-MCNC: 103 MG/DL
DRUG SCREEN COMMENT UR-IMP: ABNORMAL
GLUCOSE SERPL-MCNC: NORMAL MG/DL
HDLC SERPL-MCNC: NORMAL MG/DL
LDLC SERPL CALC-MCNC: NORMAL MG/DL (CALC)
LORAZEPAM UR-MCNC: NEGATIVE NG/ML
METHADONE UR QL: NEGATIVE NG/ML
NONHDLC SERPL-MCNC: NORMAL MG/DL (CALC)
NORDIAZEPAM UR-MCNC: NEGATIVE NG/ML
OH-ETHYLFLURAZ UR-MCNC: NEGATIVE NG/ML
OPIATES UR QL: NEGATIVE NG/ML
OXAZEPAM UR-MCNC: NEGATIVE NG/ML
OXIDANTS UR QL: NEGATIVE MCG/ML
OXYCODONE UR QL: NEGATIVE NG/ML
PCP UR QL: NEGATIVE NG/ML
PH UR: 5.6 [PH] (ref 4.5–9)
PROT UR-MCNC: 11 MG/DL (ref 5–24)
PROT/CREAT UR: 0.11 MG/MG CREAT (ref 0.02–0.18)
PROT/CREAT UR: 109 MG/G CREAT (ref 24–184)
QUEST NOTES AND COMMENTS: ABNORMAL
TEMAZEPAM UR-MCNC: NEGATIVE NG/ML
THC UR QL: NEGATIVE NG/ML
TRIGL SERPL-MCNC: NORMAL MG/DL

## 2025-04-09 DIAGNOSIS — I10 BENIGN ESSENTIAL HYPERTENSION: ICD-10-CM

## 2025-04-09 DIAGNOSIS — F41.1 GAD (GENERALIZED ANXIETY DISORDER): ICD-10-CM

## 2025-04-09 RX ORDER — MIRTAZAPINE 15 MG/1
TABLET, FILM COATED ORAL
Qty: 45 TABLET | Refills: 3 | Status: SHIPPED | OUTPATIENT
Start: 2025-04-09

## 2025-04-09 RX ORDER — VALSARTAN AND HYDROCHLOROTHIAZIDE 80; 12.5 MG/1; MG/1
1 TABLET, FILM COATED ORAL DAILY
Qty: 90 TABLET | Refills: 3 | Status: SHIPPED | OUTPATIENT
Start: 2025-04-09

## 2025-04-25 DIAGNOSIS — E11.9 TYPE 2 DIABETES MELLITUS WITHOUT COMPLICATION, WITHOUT LONG-TERM CURRENT USE OF INSULIN: ICD-10-CM

## 2025-04-25 RX ORDER — LANCETS
EACH MISCELLANEOUS
Qty: 200 EACH | Refills: 3 | Status: SHIPPED | OUTPATIENT
Start: 2025-04-25

## 2025-05-28 ENCOUNTER — APPOINTMENT (OUTPATIENT)
Dept: PRIMARY CARE | Facility: CLINIC | Age: 80
End: 2025-05-28
Payer: MEDICARE

## 2025-05-28 VITALS
SYSTOLIC BLOOD PRESSURE: 128 MMHG | BODY MASS INDEX: 26.82 KG/M2 | DIASTOLIC BLOOD PRESSURE: 72 MMHG | HEIGHT: 65 IN | WEIGHT: 161 LBS | HEART RATE: 77 BPM | OXYGEN SATURATION: 97 %

## 2025-05-28 DIAGNOSIS — E11.42 TYPE 2 DIABETES MELLITUS WITH DIABETIC POLYNEUROPATHY, WITHOUT LONG-TERM CURRENT USE OF INSULIN: ICD-10-CM

## 2025-05-28 DIAGNOSIS — E78.01 FAMILIAL HYPERCHOLESTEREMIA: ICD-10-CM

## 2025-05-28 DIAGNOSIS — I10 BENIGN ESSENTIAL HYPERTENSION: ICD-10-CM

## 2025-05-28 DIAGNOSIS — E55.9 VITAMIN D DEFICIENCY: ICD-10-CM

## 2025-05-28 DIAGNOSIS — F41.1 GAD (GENERALIZED ANXIETY DISORDER): ICD-10-CM

## 2025-05-28 DIAGNOSIS — E11.9 TYPE 2 DIABETES MELLITUS WITHOUT COMPLICATION, WITHOUT LONG-TERM CURRENT USE OF INSULIN: Primary | ICD-10-CM

## 2025-05-28 PROCEDURE — G2211 COMPLEX E/M VISIT ADD ON: HCPCS | Performed by: FAMILY MEDICINE

## 2025-05-28 PROCEDURE — 1159F MED LIST DOCD IN RCRD: CPT | Performed by: FAMILY MEDICINE

## 2025-05-28 PROCEDURE — 3078F DIAST BP <80 MM HG: CPT | Performed by: FAMILY MEDICINE

## 2025-05-28 PROCEDURE — 99214 OFFICE O/P EST MOD 30 MIN: CPT | Performed by: FAMILY MEDICINE

## 2025-05-28 PROCEDURE — 1036F TOBACCO NON-USER: CPT | Performed by: FAMILY MEDICINE

## 2025-05-28 PROCEDURE — 1160F RVW MEDS BY RX/DR IN RCRD: CPT | Performed by: FAMILY MEDICINE

## 2025-05-28 PROCEDURE — 3074F SYST BP LT 130 MM HG: CPT | Performed by: FAMILY MEDICINE

## 2025-05-28 RX ORDER — ASPIRIN 325 MG
1.25 TABLET, DELAYED RELEASE (ENTERIC COATED) ORAL
Qty: 12 CAPSULE | Refills: 3 | Status: SHIPPED | OUTPATIENT
Start: 2025-06-01

## 2025-05-28 RX ORDER — ALPRAZOLAM 0.5 MG/1
0.5 TABLET ORAL 3 TIMES DAILY PRN
Qty: 90 TABLET | Refills: 1 | Status: SHIPPED | OUTPATIENT
Start: 2025-05-28

## 2025-05-28 ASSESSMENT — ANXIETY QUESTIONNAIRES
GAD7 TOTAL SCORE: 2
5. BEING SO RESTLESS THAT IT IS HARD TO SIT STILL: NOT AT ALL
7. FEELING AFRAID AS IF SOMETHING AWFUL MIGHT HAPPEN: NOT AT ALL
4. TROUBLE RELAXING: NOT AT ALL
IF YOU CHECKED OFF ANY PROBLEMS ON THIS QUESTIONNAIRE, HOW DIFFICULT HAVE THESE PROBLEMS MADE IT FOR YOU TO DO YOUR WORK, TAKE CARE OF THINGS AT HOME, OR GET ALONG WITH OTHER PEOPLE: NOT DIFFICULT AT ALL
3. WORRYING TOO MUCH ABOUT DIFFERENT THINGS: SEVERAL DAYS
1. FEELING NERVOUS, ANXIOUS, OR ON EDGE: NOT AT ALL
2. NOT BEING ABLE TO STOP OR CONTROL WORRYING: SEVERAL DAYS
6. BECOMING EASILY ANNOYED OR IRRITABLE: NOT AT ALL

## 2025-05-28 NOTE — PROGRESS NOTES
Subjective   Patient ID: Aury Kimball is a 80 y.o. female who presents for Follow-up (Medication follow up refills ).  HPI  History of Present Illness  The patient presents for evaluation of anxiety.    She reports a general improvement in her anxiety symptoms, attributing this to the use of Xanax. However, she experiences heightened anxiety on days when she is required to leave her residence. She has been managing this condition for approximately 7 to 9 years. She occasionally experiences feelings of hopelessness and worthlessness, which she perceives as normal given the current global situation. She reports no chest pain, palpitations, or shortness of breath. She also reports no gastrointestinal symptoms such as nausea, vomiting, or diarrhea. She experiences intermittent episodes of imbalance but does not feel dizzy or as if she is going to fall. She has not noticed any changes in her vision and recently had an ophthalmological examination. She has incorporated the use of a stepper into her daily routine, starting with 75 steps in the morning and aiming for at least 35 steps per hour throughout the day. She notes that her knee condition limits her ability to use the stepper.    Ophtho- 4/25  Dentist- 2/25  [facility]- 2/25  Colonoscopy- 12/15- will consider  PATRH-  FOBT-  UA/Micro- 2/25  Mammo- refused  DEXA- refused  PAP- N/A  Lung CT-  Coronary Calcium CT Score-  AAA-  EKG-  RSV-  Prevnar-  Flu- 9/24  Shingrix- 2/1/24  Td-  Hep C-  Advance Directives- has them  ETOH screen- 2/25/25  CV risk- 2/25/25  AWV- 2/25/25  MDD screen- 2/25/25    OAR[facility]:  Pavel Jaramillo, DO on 5/28/2025  8:22 AM  I have personally reviewed the OARRS report for Aury Kimball. I have considered the risks of abuse, dependence, addiction and diversion and I believe that it is clinically appropriate for Aury Kimball to be prescribed this medication    Is the patient prescribed a combination of a benzodiazepine and opioid?  No    Last Urine Drug  Screen / ordered today: No  Recent Results (from the past 8760 hours)   Drug Screen, Urine With Reflex to Confirmation    Collection Time: 25  8:40 AM   Result Value Ref Range    Amphetamines NEGATIVE <500 ng/mL    Barbiturates NEGATIVE <300 ng/mL    Benzodiazepines POSITIVE (A) <100 ng/mL    Alphahydroxyalprazolam 219 (H) <25 ng/mL    Alphahydroxymidazolam NEGATIVE <50 ng/mL    Alphahydroxytriazolam NEGATIVE <50 ng/mL    Aminoclonazepam NEGATIVE <25 ng/mL    Hydroxyethylflurazepam NEGATIVE <50 ng/mL    Lorazepam NEGATIVE <50 ng/mL    Nordiazepam NEGATIVE <50 ng/mL    Oxazepam NEGATIVE <50 ng/mL    Temazepam NEGATIVE <50 ng/mL    Benzodiazepines Comments      Cocaine Metabolite NEGATIVE <150 ng/mL    Marijuana Metabolite NEGATIVE <20 ng/mL    Methadone Metabolite NEGATIVE <100 ng/mL    Opiates NEGATIVE <100 ng/mL    Oxycodone NEGATIVE <100 ng/mL    Phencyclidine NEGATIVE <25 ng/mL    Creatinine 103.0 > or = 20.0 mg/dL    pH 5.6 4.5 - 9.0    Oxidant NEGATIVE <200 mcg/mL    Notes and Comments       Results are as expected.         Controlled Substance Agreement:  Date of the Last Agreement: 24  Reviewed Controlled Substance Agreement including but not limited to the benefits, risks, and alternatives to treatment with a Controlled Substance medication(s).    Benzodiazepines:  What is the patient's goal of therapy? Able to relax  Is this being achieved with current treatment? Yes    REDD-7:  Over the last 2 weeks, how often have you been bothered by any of the following problems?  Feeling nervous, anxious, or on edge: 0  Not being able to stop or control worryin  Worrying too much about different things: 1  Trouble relaxin  Being so restless that it is hard to sit still: 0  Becoming easily annoyed or irritable: 0  Feeling afraid as if something awful might happen: 0  REDD-7 Total Score: 2        Activities of Daily Living:   Is your overall impression that this patient is benefiting (symptom reduction  "outweighs side effects) from benzodiazepine therapy? Yes     1. Physical Functioning: Same  2. Family Relationship: Better  3. Social Relationship: Better  4. Mood: Better  5. Sleep Patterns: Better  6. Overall Function: Better    Current Medications[1]   Surgical History[2]   Medical History[3]  Social History[4]   Family History[5]   Review of Systems    Objective   /72   Pulse 77   Ht 1.651 m (5' 5\")   Wt 73 kg (161 lb)   SpO2 97%   BMI 26.79 kg/m²    Physical Exam  Vitals and nursing note reviewed.   Constitutional:       General: She is not in acute distress.     Appearance: Normal appearance.   HENT:      Head: Normocephalic and atraumatic.      Right Ear: Tympanic membrane, ear canal and external ear normal.      Left Ear: Tympanic membrane, ear canal and external ear normal.      Nose: Nose normal.      Mouth/Throat:      Mouth: Mucous membranes are moist.      Pharynx: Oropharynx is clear.   Eyes:      Extraocular Movements: Extraocular movements intact.      Conjunctiva/sclera: Conjunctivae normal.      Pupils: Pupils are equal, round, and reactive to light.   Neck:      Vascular: No carotid bruit.   Cardiovascular:      Rate and Rhythm: Normal rate and regular rhythm.      Pulses: Normal pulses.      Heart sounds: Normal heart sounds. No murmur heard.  Pulmonary:      Effort: Pulmonary effort is normal.      Breath sounds: Normal breath sounds. No wheezing, rhonchi or rales.   Abdominal:      General: Abdomen is flat. Bowel sounds are normal.      Palpations: Abdomen is soft. There is no mass.   Musculoskeletal:         General: Normal range of motion.      Cervical back: Normal range of motion and neck supple.   Lymphadenopathy:      Cervical: No cervical adenopathy.   Skin:     Capillary Refill: Capillary refill takes less than 2 seconds.   Neurological:      General: No focal deficit present.      Mental Status: She is alert and oriented to person, place, and time.      Cranial Nerves: No " cranial nerve deficit.      Motor: No weakness.      Deep Tendon Reflexes: Reflexes normal.   Psychiatric:         Mood and Affect: Mood normal.         Behavior: Behavior normal.         Assessment/Plan   Problem List Items Addressed This Visit       Vitamin D deficiency    Relevant Medications    cholecalciferol (Vitamin D-3) 1.25 mg (50,000 units) capsule (Start on 6/1/2025)    Type 2 diabetes mellitus without complication, without long-term current use of insulin - Primary    Type 2 diabetes mellitus with diabetic polyneuropathy, without long-term current use of insulin    REDD (generalized anxiety disorder)    Relevant Medications    ALPRAZolam (Xanax) 0.5 mg tablet    Familial hypercholesteremia    Benign essential hypertension     Renewed/continued rest of medications  Checked  labs  Updated Health Maintenance in HPI section  HTN, controlled- continue meds, low sodium diet     Overweight- caloric restriction, increase CV exercise     Hyperlipidemia- continue meds, low fat/cholesterol diet     GERD- omeprazole, limit triggers     DM, type 2- avoid sugars, low carbs, increase CV exercise, continue meds, check feet daily     MDD/REDD- Fluoxetine, xanax     Vitamin D- supplement, follow level  Assessment & Plan  1. Anxiety.  - Anxiety is well-managed with Xanax.  - Reports occasional feelings of hopelessness and worthlessness, considered normal given current world events.  - No significant symptoms of chest pain, heart racing, skipped beats, shortness of breath, nausea, vomiting, diarrhea, dizziness, or vision changes.  - Prescription for Xanax will be sent to pharmacy.    2. Vitamin D deficiency.  - No specific symptoms discussed.  - No recent lab results or physical exam findings mentioned.  - Prescription for vitamin D will be sent to pharmacy.  - Follow-up scheduled in 3 months.       Patient understands and agrees with treatment plan    This medical note was created with the assistance of artificial  intelligence (AI) for documentation purposes. The content has been reviewed and confirmed by the healthcare provider for accuracy and completeness. Patient consented to the use of audio recording and use of AI during their visit.     Pavel Jaramillo DO          [1]   Current Outpatient Medications:     alcohol swabs pads, medicated, Apply 1 Swab topically in the morning and 1 Swab before bedtime. Use to check blood glucose twice daily.., Disp: 200 each, Rfl: 3    betamethasone dipropionate 0.05 % cream, Apply topically 2 times a day., Disp: , Rfl:     blood sugar diagnostic (Accu-Chek Guide test strips) strip, TEST TWO TIMES DAILY, Disp: 200 strip, Rfl: 3    blood-glucose meter (Accu-Chek Guide Glucose Meter) misc, USE AS DIRECTED, Disp: 1 each, Rfl: 3    colchicine 0.6 mg tablet, Take 1 tablet (0.6 mg) by mouth once daily., Disp: , Rfl:     lancets (Accu-Chek Fastclix Lancet Drum) [facility], TEST BLOOD SUGAR TWICE DAILY, Disp: 200 each, Rfl: 3    metFORMIN (Glucophage) 500 mg tablet, Take 1 tablet (500 mg) by mouth 2 times daily (morning and late afternoon)., Disp: 180 tablet, Rfl: 3    mirtazapine (Remeron) 15 mg tablet, TAKE 1/2 TABLET ONE TIME DAILY AT BEDTIME, Disp: 45 tablet, Rfl: 3    omeprazole (PriLOSEC) 20 mg DR capsule, Take 1 capsule (20 mg) by mouth once daily in the morning. Take before meals., Disp: 90 capsule, Rfl: 3    rosuvastatin (Crestor) 20 mg tablet, Take 1 tablet (20 mg) by mouth once daily., Disp: 90 tablet, Rfl: 3    valsartan-hydrochlorothiazide (Diovan-HCT) 80-12.5 mg tablet, TAKE 1 TABLET EVERY DAY, Disp: 90 tablet, Rfl: 3    vit C-zinc citrate-elderberry 30-1.1-25 mg tablet,chewable, Chew 1 tablet once daily., Disp: , Rfl:     ALPRAZolam (Xanax) 0.5 mg tablet, Take 1 tablet (0.5 mg) by mouth 3 times a day as needed for anxiety., Disp: 90 tablet, Rfl: 1    [START ON 6/1/2025] cholecalciferol (Vitamin D-3) 1.25 mg (50,000 units) capsule, Take 1 capsule (1.25 mg) by mouth 1 (one) time per  week., Disp: 12 capsule, Rfl: 3  [2]   Past Surgical History:  Procedure Laterality Date    APPENDECTOMY  07/05/2013    Appendectomy    CARDIAC CATHETERIZATION  06/07/2016    Cardiac Cath Procedure Summary    ELBOW SURGERY  07/05/2013    Elbow Surgery    GALLBLADDER SURGERY  07/05/2013    Gallbladder Surgery    NECK SURGERY  07/05/2013    Neck Surgery    OTHER SURGICAL HISTORY  07/05/2013    Hysterotomy (Obstetrical)    OTHER SURGICAL HISTORY  01/08/2019    Complete colonoscopy    SHOULDER SURGERY  07/05/2013    Shoulder Surgery Right   [3]   Past Medical History:  Diagnosis Date    Abdominal distension (gaseous) 10/02/2017    Bloating    Acute maxillary sinusitis, unspecified 12/21/2018    Acute non-recurrent maxillary sinusitis    Body mass index (BMI) 26.0-26.9, adult 01/29/2021    Body mass index (BMI) of 26.0 to 26.9 in adult    Body mass index (BMI) 28.0-28.9, adult 06/10/2020    Body mass index (BMI) of 28.0 to 28.9 in adult    Body mass index (BMI) 29.0-29.9, adult 03/09/2019    BMI 29.0-29.9,adult    COVID-19 virus infection 02/18/2023    Diverticulosis of intestine, part unspecified, without perforation or abscess without bleeding 07/01/2019    Diverticulosis    Encounter for screening for diabetes mellitus 11/14/2016    Encounter for screening for diabetes mellitus    Fatty (change of) liver, not elsewhere classified 01/15/2019    Nonalcoholic fatty liver disease    Left lower quadrant pain 07/01/2019    Abdominal pain, LLQ (left lower quadrant)    Other gastritis without bleeding 05/08/2017    Bile reflux gastritis    Other intervertebral disc displacement, thoracic region 07/05/2013    Thoracic disc herniation    Pain in unspecified foot 03/07/2018    Pains, foot    Personal history of other (healed) physical injury and trauma 06/12/2017    History of traumatic injury of head    Personal history of other diseases of the respiratory system 10/10/2018    History of allergic rhinitis    Personal history of  other diseases of the respiratory system 2020    History of bronchitis    Personal history of other diseases of the respiratory system 2016    History of laryngitis    Personal history of other endocrine, nutritional and metabolic disease 10/10/2018    History of hyperglycemia    Personal history of other endocrine, nutritional and metabolic disease 2018    History of hyperglycemia    Personal history of other infectious and parasitic diseases 2020    History of candidiasis of vagina    Personal history of other mental and behavioral disorders 2019    History of depression    Personal history of other mental and behavioral disorders 2013    History of anxiety disorder    Personal history of other specified conditions 2016    History of abdominal pain    Prediabetes 04/15/2018    Pre-diabetes    Spondylosis without myelopathy or radiculopathy, cervical region     Cervical spondylosis    Spondylosis without myelopathy or radiculopathy, lumbar region 2013    Lumbar spondylosis   [4]   Social History  Tobacco Use    Smoking status: Former     Current packs/day: 0.00     Types: Cigarettes     Quit date:      Years since quittin.4    Smokeless tobacco: Former   Vaping Use    Vaping status: Never Used   Substance Use Topics    Alcohol use: Not Currently    Drug use: Never   [5]   Family History  Problem Relation Name Age of Onset    Severe combined immunodeficiency Mother      Alzheimer's disease Father      Breast cancer Sister      Colon cancer Sister      Other (Malignant Neoplasm of urinar bladder) Sister      Colon cancer Brother      Skin cancer Brother

## 2025-06-16 DIAGNOSIS — E11.9 TYPE 2 DIABETES MELLITUS WITHOUT COMPLICATION, WITHOUT LONG-TERM CURRENT USE OF INSULIN: ICD-10-CM

## 2025-06-16 RX ORDER — BLOOD SUGAR DIAGNOSTIC
STRIP MISCELLANEOUS
Qty: 200 STRIP | Refills: 3 | Status: SHIPPED | OUTPATIENT
Start: 2025-06-16

## 2025-07-17 ENCOUNTER — TELEPHONE (OUTPATIENT)
Dept: PRIMARY CARE | Facility: CLINIC | Age: 80
End: 2025-07-17
Payer: MEDICARE

## 2025-07-17 DIAGNOSIS — K57.92 DIVERTICULITIS: ICD-10-CM

## 2025-07-17 RX ORDER — AMOXICILLIN AND CLAVULANATE POTASSIUM 875; 125 MG/1; MG/1
875 TABLET, FILM COATED ORAL 2 TIMES DAILY
Qty: 20 TABLET | Refills: 0 | Status: SHIPPED | OUTPATIENT
Start: 2025-07-17 | End: 2025-07-27

## 2025-07-17 RX ORDER — METRONIDAZOLE 500 MG/1
500 TABLET ORAL 2 TIMES DAILY
Qty: 20 TABLET | Refills: 0 | Status: SHIPPED | OUTPATIENT
Start: 2025-07-17 | End: 2025-07-27

## 2025-07-17 RX ORDER — FLUCONAZOLE 150 MG/1
150 TABLET ORAL ONCE
Qty: 1 TABLET | Refills: 0 | Status: SHIPPED | OUTPATIENT
Start: 2025-07-17 | End: 2025-07-17

## 2025-07-17 NOTE — TELEPHONE ENCOUNTER
She called and said she is having a diverticulitis flair up, asked if you could send in abx? Also fluconazole for vaginal itching since she gets that every time she is on abx

## 2025-08-26 ENCOUNTER — APPOINTMENT (OUTPATIENT)
Dept: PRIMARY CARE | Facility: CLINIC | Age: 80
End: 2025-08-26
Payer: MEDICARE

## 2025-08-26 VITALS
HEART RATE: 62 BPM | OXYGEN SATURATION: 98 % | HEIGHT: 65 IN | DIASTOLIC BLOOD PRESSURE: 64 MMHG | BODY MASS INDEX: 25.83 KG/M2 | SYSTOLIC BLOOD PRESSURE: 124 MMHG | WEIGHT: 155 LBS

## 2025-08-26 DIAGNOSIS — F41.1 GAD (GENERALIZED ANXIETY DISORDER): ICD-10-CM

## 2025-08-26 DIAGNOSIS — I10 BENIGN ESSENTIAL HYPERTENSION: ICD-10-CM

## 2025-08-26 DIAGNOSIS — E11.9 TYPE 2 DIABETES MELLITUS WITHOUT COMPLICATION, WITHOUT LONG-TERM CURRENT USE OF INSULIN: ICD-10-CM

## 2025-08-26 DIAGNOSIS — E78.01 FAMILIAL HYPERCHOLESTEREMIA: ICD-10-CM

## 2025-08-26 DIAGNOSIS — E11.42 TYPE 2 DIABETES MELLITUS WITH DIABETIC POLYNEUROPATHY, WITHOUT LONG-TERM CURRENT USE OF INSULIN: Primary | ICD-10-CM

## 2025-08-26 DIAGNOSIS — E55.9 VITAMIN D DEFICIENCY: ICD-10-CM

## 2025-08-26 PROBLEM — B37.31 VULVOVAGINITIS CANDIDA ALBICANS: Status: RESOLVED | Noted: 2023-02-18 | Resolved: 2025-08-26

## 2025-08-26 PROBLEM — R10.11 ABDOMINAL PAIN, RUQ (RIGHT UPPER QUADRANT): Status: RESOLVED | Noted: 2023-02-18 | Resolved: 2025-08-26

## 2025-08-26 LAB — POC HEMOGLOBIN A1C: 5.9 % (ref 4.2–6.5)

## 2025-08-26 PROCEDURE — 99214 OFFICE O/P EST MOD 30 MIN: CPT | Performed by: FAMILY MEDICINE

## 2025-08-26 PROCEDURE — 1159F MED LIST DOCD IN RCRD: CPT | Performed by: FAMILY MEDICINE

## 2025-08-26 PROCEDURE — 3078F DIAST BP <80 MM HG: CPT | Performed by: FAMILY MEDICINE

## 2025-08-26 PROCEDURE — G2211 COMPLEX E/M VISIT ADD ON: HCPCS | Performed by: FAMILY MEDICINE

## 2025-08-26 PROCEDURE — 83036 HEMOGLOBIN GLYCOSYLATED A1C: CPT | Performed by: FAMILY MEDICINE

## 2025-08-26 PROCEDURE — 1160F RVW MEDS BY RX/DR IN RCRD: CPT | Performed by: FAMILY MEDICINE

## 2025-08-26 PROCEDURE — 3074F SYST BP LT 130 MM HG: CPT | Performed by: FAMILY MEDICINE

## 2025-08-26 RX ORDER — ALPRAZOLAM 0.5 MG/1
0.5 TABLET ORAL 3 TIMES DAILY PRN
Qty: 90 TABLET | Refills: 2 | Status: SHIPPED | OUTPATIENT
Start: 2025-08-26

## 2025-08-26 RX ORDER — VALSARTAN AND HYDROCHLOROTHIAZIDE 80; 12.5 MG/1; MG/1
1 TABLET, FILM COATED ORAL DAILY
Qty: 90 TABLET | Refills: 3 | Status: SHIPPED | OUTPATIENT
Start: 2025-08-26

## 2025-08-26 RX ORDER — METFORMIN HYDROCHLORIDE 500 MG/1
500 TABLET ORAL
Qty: 180 TABLET | Refills: 3 | Status: SHIPPED | OUTPATIENT
Start: 2025-08-26

## 2025-08-26 RX ORDER — ROSUVASTATIN CALCIUM 20 MG/1
20 TABLET, COATED ORAL DAILY
Qty: 90 TABLET | Refills: 3 | Status: SHIPPED | OUTPATIENT
Start: 2025-08-26

## 2025-08-26 RX ORDER — VALSARTAN AND HYDROCHLOROTHIAZIDE 80; 12.5 MG/1; MG/1
1 TABLET, FILM COATED ORAL DAILY
Qty: 90 TABLET | Refills: 3 | Status: SHIPPED | OUTPATIENT
Start: 2025-08-26 | End: 2025-08-26

## 2025-08-26 RX ORDER — ASPIRIN 325 MG
1.25 TABLET, DELAYED RELEASE (ENTERIC COATED) ORAL
Qty: 12 CAPSULE | Refills: 3 | Status: SHIPPED | OUTPATIENT
Start: 2025-08-26

## 2025-08-26 RX ORDER — MIRTAZAPINE 15 MG/1
TABLET, FILM COATED ORAL
Qty: 45 TABLET | Refills: 3 | Status: SHIPPED | OUTPATIENT
Start: 2025-08-26 | End: 2025-08-26

## 2025-08-26 RX ORDER — METFORMIN HYDROCHLORIDE 500 MG/1
500 TABLET ORAL
Qty: 180 TABLET | Refills: 3 | Status: SHIPPED | OUTPATIENT
Start: 2025-08-26 | End: 2025-08-26

## 2025-08-26 RX ORDER — ASPIRIN 325 MG
1.25 TABLET, DELAYED RELEASE (ENTERIC COATED) ORAL
Qty: 12 CAPSULE | Refills: 3 | Status: SHIPPED | OUTPATIENT
Start: 2025-08-26 | End: 2025-08-26

## 2025-08-26 RX ORDER — MIRTAZAPINE 15 MG/1
TABLET, FILM COATED ORAL
Qty: 45 TABLET | Refills: 3 | Status: SHIPPED | OUTPATIENT
Start: 2025-08-26

## 2025-08-26 RX ORDER — ROSUVASTATIN CALCIUM 20 MG/1
20 TABLET, COATED ORAL DAILY
Qty: 90 TABLET | Refills: 3 | Status: SHIPPED | OUTPATIENT
Start: 2025-08-26 | End: 2025-08-26

## 2025-08-26 ASSESSMENT — ANXIETY QUESTIONNAIRES
2. NOT BEING ABLE TO STOP OR CONTROL WORRYING: SEVERAL DAYS
3. WORRYING TOO MUCH ABOUT DIFFERENT THINGS: NOT AT ALL
GAD7 TOTAL SCORE: 5
IF YOU CHECKED OFF ANY PROBLEMS ON THIS QUESTIONNAIRE, HOW DIFFICULT HAVE THESE PROBLEMS MADE IT FOR YOU TO DO YOUR WORK, TAKE CARE OF THINGS AT HOME, OR GET ALONG WITH OTHER PEOPLE: SOMEWHAT DIFFICULT
4. TROUBLE RELAXING: SEVERAL DAYS
5. BEING SO RESTLESS THAT IT IS HARD TO SIT STILL: SEVERAL DAYS
1. FEELING NERVOUS, ANXIOUS, OR ON EDGE: SEVERAL DAYS
7. FEELING AFRAID AS IF SOMETHING AWFUL MIGHT HAPPEN: NOT AT ALL
6. BECOMING EASILY ANNOYED OR IRRITABLE: SEVERAL DAYS

## 2025-08-26 ASSESSMENT — PATIENT HEALTH QUESTIONNAIRE - PHQ9
SUM OF ALL RESPONSES TO PHQ9 QUESTIONS 1 & 2: 0
1. LITTLE INTEREST OR PLEASURE IN DOING THINGS: NOT AT ALL
2. FEELING DOWN, DEPRESSED OR HOPELESS: NOT AT ALL

## 2025-08-26 ASSESSMENT — ENCOUNTER SYMPTOMS
DEPRESSION: 0
OCCASIONAL FEELINGS OF UNSTEADINESS: 0
LOSS OF SENSATION IN FEET: 0

## 2025-08-28 LAB
ALBUMIN/CREAT UR: 8 MG/G CREAT
CREAT UR-MCNC: 152 MG/DL (ref 20–275)
MICROALBUMIN UR-MCNC: 1.2 MG/DL

## 2025-12-02 ENCOUNTER — APPOINTMENT (OUTPATIENT)
Dept: PRIMARY CARE | Facility: CLINIC | Age: 80
End: 2025-12-02
Payer: MEDICARE